# Patient Record
Sex: FEMALE | Race: WHITE | Employment: FULL TIME | URBAN - NONMETROPOLITAN AREA
[De-identification: names, ages, dates, MRNs, and addresses within clinical notes are randomized per-mention and may not be internally consistent; named-entity substitution may affect disease eponyms.]

---

## 2020-01-22 ENCOUNTER — OFFICE VISIT (OUTPATIENT)
Dept: ENT CLINIC | Age: 45
End: 2020-01-22
Payer: COMMERCIAL

## 2020-01-22 VITALS — TEMPERATURE: 98.2 F | RESPIRATION RATE: 18 BRPM | WEIGHT: 238 LBS | BODY MASS INDEX: 33.32 KG/M2 | HEIGHT: 71 IN

## 2020-01-22 PROBLEM — H69.93 DISORDER OF BOTH EUSTACHIAN TUBES: Status: ACTIVE | Noted: 2020-01-22

## 2020-01-22 PROBLEM — H65.23 CHRONIC SEROUS OTITIS MEDIA OF BOTH EARS: Status: ACTIVE | Noted: 2020-01-22

## 2020-01-22 PROBLEM — C91.10 CLL (CHRONIC LYMPHOCYTIC LEUKEMIA) (HCC): Status: ACTIVE | Noted: 2020-01-22

## 2020-01-22 PROCEDURE — 99213 OFFICE O/P EST LOW 20 MIN: CPT | Performed by: OTOLARYNGOLOGY

## 2020-01-22 PROCEDURE — 69433 CREATE EARDRUM OPENING: CPT | Performed by: OTOLARYNGOLOGY

## 2020-01-22 RX ORDER — MULTIVITAMIN,THERAPEUTIC
1 TABLET ORAL
COMMUNITY

## 2020-01-22 ASSESSMENT — ENCOUNTER SYMPTOMS
ANAL BLEEDING: 0
FACIAL SWELLING: 0
EYE ITCHING: 0
VOICE CHANGE: 0
CHOKING: 0
WHEEZING: 0
COLOR CHANGE: 0
ABDOMINAL DISTENTION: 0
COUGH: 0
EYE PAIN: 0
SINUS PRESSURE: 0
ABDOMINAL PAIN: 0
NAUSEA: 0
CHEST TIGHTNESS: 0
EYE REDNESS: 0
DIARRHEA: 0
CONSTIPATION: 0
STRIDOR: 0
PHOTOPHOBIA: 0
BACK PAIN: 0
RHINORRHEA: 0
EYE DISCHARGE: 0
SORE THROAT: 0
SHORTNESS OF BREATH: 0
BLOOD IN STOOL: 0
TROUBLE SWALLOWING: 0
SINUS PAIN: 0
VOMITING: 0
RECTAL PAIN: 0
APNEA: 0

## 2020-01-22 NOTE — PROGRESS NOTES
ear and used it x 1 week. Ciprodex in chart and Pt confirms this is what she used. Used Flonase x 1.5-2 week with no relief. Aggravating factors:  Nothing   Associated factors:  Denies ear drainage. Denies vertigo. Reports nasal/sinus congestion. Denies sinus pain and headaches. Snores awake and sleeping.  recently has told her she has sleep apnea. Denies nasal drainage. Denies hoarseness. Review of systems covering 10 systems is reviewed as staff entry in other note and pertinent positives and negatives noted.     Past Medical History:   Diagnosis Date    Anxiety     Apnea     Enlarged lymph nodes     Headache     IBS (irritable bowel syndrome)     Meningismus     Neuralgia     Obesity     Small cell B-cell lymphoma (HCC)        Current Outpatient Medications:     Multiple Vitamin (THERA/BETA-CAROTENE) TABS, Take 1 tablet by mouth, Disp: , Rfl:    Allergies   Allergen Reactions    Amoxicillin-Pot Clavulanate     Sulfa Antibiotics Rash      Past Surgical History:   Procedure Laterality Date    ANTERIOR CRUCIATE LIGAMENT REPAIR      CHOLECYSTECTOMY        Social History     Socioeconomic History    Marital status:      Spouse name: Not on file    Number of children: Not on file    Years of education: Not on file    Highest education level: Not on file   Occupational History    Not on file   Social Needs    Financial resource strain: Not on file    Food insecurity:     Worry: Not on file     Inability: Not on file    Transportation needs:     Medical: Not on file     Non-medical: Not on file   Tobacco Use    Smoking status: Never Smoker    Smokeless tobacco: Never Used   Substance and Sexual Activity    Alcohol use: Not Currently    Drug use: Never    Sexual activity: Not on file   Lifestyle    Physical activity:     Days per week: Not on file     Minutes per session: Not on file    Stress: Not on file   Relationships    Social connections:     Talks on phone: cyanosis, no edema    SKIN:    General Appearance: no lesions, warm and dry, normal turgor, no bruising    NEUROLOGICAL SYSTEM:    Orientation: oriented to time, oriented to place, oriented to person    Cranial Nerves: Cranial Nerves II-XII intact, normal facial movement    PSYCHIATRIC:    Mood and affect: normal mood, normal affect      TYMPANOSTOMY TUBE PLACEMENT NOTE (60836)    PROCEDURE PERFORMED BY: LYUBVO GREGG    PROCEDURE DATE: 1/22/2020    The patient and/or caregiver is counseled regarding the above procedure including risks, alternatives, potential complications and benefits, and is agreeable to proceed. Witnessed informed consent is obtained. A bilateral ear tube placement is performed as follows. With the patients consent, the operative microscope is used to visualize the tympanic membrane through an otic speculum. Any obstructing cerumen is removed. The zane-inferior portion of the tympanic membrane is then anesthetized by topical application of phenol. A myringotomy is then performed and any fluid present aspirated with a #5 suction. An Tejeda type II beveled grommet tube is then placed. The patient tolerated the procedure well without complication. There is noted to be improved hearing after the procedure. The patient is advised to avoid water exposure to the ear and to call for significant pain, bleeding, drainage, vertigo, or change in hearing. Assessment and Plan:    She presents with chronic effusions in a background of CLL. Bilateral ventilation tube placement is offered for relief of her ear symptoms as this has failed to resolved with waiting and nasal steroid therapy. This results in immediate improvement in her hearing and I am hopeful this will give her lasting relief. I suspect her CLL may involve the ET tube lymph tissues resulting in her chronic effusions, and adenotonsillar hypertrophy is noted as well as cervical adenopathy consistent with this.   She continues under oncology for her care in this regard. Diagnosis Orders   1. Disorder of both eustachian tubes     2. Chronic serous otitis media of both ears     3. CLL (chronic lymphocytic leukemia) ([facility] [address]          The patient and/or caregiver is to notify the office if no improvement or worsening of symptoms is noted prior to the scheduled follow-up for sooner evaluation. The patient and/or caregiver is able to state an understanding of these recommendations and is agreeable to the treatment plan.

## 2020-01-22 NOTE — PROGRESS NOTES
Review of Systems   Constitutional: Negative for activity change, appetite change, chills, diaphoresis, fatigue, fever and unexpected weight change. HENT: Positive for congestion and hearing loss. Negative for dental problem, drooling, ear discharge, ear pain, facial swelling, mouth sores, nosebleeds, postnasal drip, rhinorrhea, sinus pressure, sinus pain, sneezing, sore throat, tinnitus, trouble swallowing and voice change. Eyes: Negative for photophobia, pain, discharge, redness, itching and visual disturbance. Respiratory: Negative for apnea, cough, choking, chest tightness, shortness of breath, wheezing and stridor. Cardiovascular: Negative for chest pain, palpitations and leg swelling. Gastrointestinal: Negative for abdominal distention, abdominal pain, anal bleeding, blood in stool, constipation, diarrhea, nausea, rectal pain and vomiting. Endocrine: Negative for cold intolerance, heat intolerance, polydipsia, polyphagia and polyuria. Genitourinary: Negative for decreased urine volume, difficulty urinating, dyspareunia, dysuria, enuresis, flank pain, frequency, genital sores, hematuria, menstrual problem, pelvic pain, urgency, vaginal bleeding, vaginal discharge and vaginal pain. Musculoskeletal: Negative for arthralgias, back pain, gait problem, joint swelling, myalgias, neck pain and neck stiffness. Skin: Negative for color change, pallor, rash and wound. Allergic/Immunologic: Negative for environmental allergies, food allergies and immunocompromised state. Neurological: Negative for dizziness, tremors, seizures, syncope, facial asymmetry, speech difficulty, weakness, light-headedness, numbness and headaches. Hematological: Negative for adenopathy. Does not bruise/bleed easily. Psychiatric/Behavioral: Negative for agitation, behavioral problems, confusion, decreased concentration, dysphoric mood, hallucinations, self-injury, sleep disturbance and suicidal ideas.  The patient is not

## 2020-07-06 ENCOUNTER — OFFICE VISIT (OUTPATIENT)
Dept: ENT CLINIC | Age: 45
End: 2020-07-06
Payer: COMMERCIAL

## 2020-07-06 VITALS
WEIGHT: 232 LBS | SYSTOLIC BLOOD PRESSURE: 109 MMHG | HEART RATE: 91 BPM | TEMPERATURE: 97.2 F | BODY MASS INDEX: 32.48 KG/M2 | HEIGHT: 71 IN | DIASTOLIC BLOOD PRESSURE: 70 MMHG | OXYGEN SATURATION: 98 %

## 2020-07-06 PROCEDURE — 99214 OFFICE O/P EST MOD 30 MIN: CPT | Performed by: PHYSICIAN ASSISTANT

## 2020-07-06 ASSESSMENT — ENCOUNTER SYMPTOMS
ANAL BLEEDING: 0
EYE ITCHING: 0
VOICE CHANGE: 0
VOMITING: 0
ABDOMINAL DISTENTION: 0
RECTAL PAIN: 0
STRIDOR: 0
SORE THROAT: 0
SINUS PRESSURE: 0
APNEA: 0
BLOOD IN STOOL: 0
DIARRHEA: 0
SINUS PAIN: 0
COUGH: 0
BACK PAIN: 0
SHORTNESS OF BREATH: 0
EYE PAIN: 0
CHOKING: 0
EYE DISCHARGE: 1
COLOR CHANGE: 0
TROUBLE SWALLOWING: 0
PHOTOPHOBIA: 0
RHINORRHEA: 0
NAUSEA: 0
WHEEZING: 0
FACIAL SWELLING: 0
EYE REDNESS: 0
CONSTIPATION: 0
ABDOMINAL PAIN: 0
CHEST TIGHTNESS: 0

## 2020-07-06 NOTE — PROGRESS NOTES
Review of Systems   Constitutional: Positive for fatigue. Negative for activity change, appetite change, chills, diaphoresis, fever and unexpected weight change. HENT: Positive for ear pain (bilateral), sneezing and tinnitus. Negative for congestion, dental problem, drooling, ear discharge, facial swelling, hearing loss, mouth sores, nosebleeds, postnasal drip, rhinorrhea, sinus pressure, sinus pain, sore throat, trouble swallowing and voice change. Eyes: Positive for discharge (watery). Negative for photophobia, pain, redness, itching and visual disturbance. Respiratory: Negative for apnea, cough, choking, chest tightness, shortness of breath, wheezing and stridor. Cardiovascular: Negative for chest pain, palpitations and leg swelling. Gastrointestinal: Negative for abdominal distention, abdominal pain, anal bleeding, blood in stool, constipation, diarrhea, nausea, rectal pain and vomiting. Endocrine: Negative for cold intolerance, heat intolerance, polydipsia, polyphagia and polyuria. Genitourinary: Negative for decreased urine volume, difficulty urinating, dyspareunia, dysuria, enuresis, flank pain, frequency, genital sores, hematuria, menstrual problem, pelvic pain, urgency, vaginal bleeding, vaginal discharge and vaginal pain. Musculoskeletal: Negative for arthralgias, back pain, gait problem, joint swelling, myalgias, neck pain and neck stiffness. Skin: Negative for color change, pallor, rash and wound. Allergic/Immunologic: Negative for environmental allergies, food allergies and immunocompromised state. Neurological: Positive for headaches (occasional). Negative for dizziness, tremors, seizures, syncope, facial asymmetry, speech difficulty, weakness, light-headedness and numbness. Hematological: Negative for adenopathy. Does not bruise/bleed easily. Psychiatric/Behavioral: Positive for decreased concentration.  Negative for agitation, behavioral problems, confusion, dysphoric mood, hallucinations, self-injury, sleep disturbance and suicidal ideas. The patient is nervous/anxious. The patient is not hyperactive.

## 2020-07-06 NOTE — PATIENT INSTRUCTIONS
NASAL SALINE (SALTWATER) RINSES     Your doctor has recommended the use of saline (salt water) nasal rinses. Nasal rinses have been used for centuries for the treatment of nasal and sinus infection, bothersome secretions, allergy, and nasal dryness. It is safe and effective for use in both children and adults. Daily rinsing of the nasal passages with saline promotes nasal and sinus health by washing away dried mucus, infected secretions, dust, pollen, and mold spores, by moisturizing the nasal lining, promoting normal mucus flow, and naturally decongesting inflamed tissues. Rinses also help with clearing dried blood, mucus, and infection after sinus surgery and promote rapid healing of tissue in this setting. In the beginning it is normal to have some difficulty with performing nasal rinses, but with practice, most people find that it becomes part of their normal routine just as much as brushing teeth or showering, and often, most wouldnt think of leaving home without it. RINSE INGREDIENTS:  No special tools are required for nasal saline rinses, and everything that you need can be picked up at your local pharmacy and grocery. Commercial saline rinses and syringe systems such as NeilMed SINUS RINSE, Ayr Saline Nasal Rinse, or Neti Pot are available, but these may contain preservatives or other irritants, and it is often less expensive and more convenient to make it at home.   You will need the following:  Distilled water (tap water contains irritating minerals and bacteria, but may be used if boiled)  Kosher, pickling, or non-iodized table salt (regular table salt contains iodine, an irritant)  Baking soda (not baking powder)  Corn syrup (optional, for additional moisturizing effect)  White vinegar (for antiseptic effect, if recommended by your doctor)  An airtight container for mixing saline rinse  Rubber bulb syringe (like those used to clear infant noses and ears)  Household bleach (for cleaning container and bulb syringe)    PREPARATION:  To prepare the rinses, measure out 1 quart (1 liter) of distilled or boiled tap water into the mixing container. Then add 2-3 heaping teaspoons of salt and 1 teaspoon of baking soda, mix to dissolve, and place in refrigerator for 1-2 days of storage. You may add 3-4 tablespoons of corn syrup if you experience nasal dryness. If your doctor recommends it, add 1 teaspoon of white vinegar to this mixture as well. STORAGE & CLEANING:  It is advised that you make up fresh rinse every day or two, and that you keep the unused rinse in the refrigerator in an airtight container to prevent bacterial growth. Set out enough rinse for your next use well in advance to let it come to room temperature before use. Wash the container and bulb syringe at least once a week in a quart of water with 2 tablespoons of bleach, rinse and dry to prevent bacterial growth    USING SALINE RINSES:  To perform nasal saline rinses, plan on using at least 1 pint (2 cups) twice daily. Lean over a sink or other basin (some people prefer to do this in the shower as it can be messy, especially when you first start) to catch the rinse as it drains from your nose and mouth. Fill the bulb syringe with the rinse solution and place it into the nostril on one side. Gently squeeze the bulb to flush the nasal passage until the rinse drains clear. Repeat on the other side. You should plan on using the rinses twice a day, which should take about 10 minutes to perform. OTHER MEDICATIONS:  If your doctor has prescribed other nasal spray medications, such as a nasal steroid, it is best to apply these after the nasal rinse so that you do not wash the medication away. It is also safe to continue with your other antihistamine or decongestant medications that your doctor may have prescribed in addition to the saline rinses.   If you have an allergy or adverse reaction to any of the ingredients do not use it

## 2020-07-06 NOTE — PROGRESS NOTES
[facility] [address] [place] [address] [facility]  [address] [address]  Dept: [phone]   Lena Mercedes MD (supervising physician)    Alicia Workmanalpa 38 y.o. female     Patient presents with a chief complaint of Follow-up (Patient is present today with c/o pain and pressure in both ears. She denies experiencing symptoms of nausea, vomiting, fever, or chills. Patient does feel she has experienced decreased concentration and has been forgetful. )       /70 (Site: Left Upper Arm, Position: Sitting, Cuff Size: Medium Adult)   Pulse 91   Temp 97.2 °F (36.2 °C) (Infrared)   Ht 5' 11\" (1.803 m)   Wt 232 lb (105.2 kg)   LMP 06/02/2020 (Approximate)   SpO2 98%   Breastfeeding No   BMI 32.36 kg/m²       History of Presenting Illness: The patient/caregiver reports a history of complaint with the following features: Onset/Quality:  Ear pain x 1.5-2 weeks. Timing:   Duration:  Pain is intermittent. Lasts for a \"few\" minutes. Location:  Both ears/ can affect either ear. Severity:  Initially denied active ear pain, but at a point later during visit had brief pain in the left ear. Associated symptoms/other symptoms:  Denies fever. Can't say ear \"pressure\" when I inquire if ear pressure, although documented in chief complaint by nursing staff. Denies ear drainage. Sound in ears that started at least before COVID-19 pandemic started. Described as a swooshing sound. Sometimes affects the right ear, sometimes the left, and sometimes both. It is intermittent. Noticed more at night when it's quiet (position doesn't matter). Seems like when lots of background noise then has trouble hearing, but denies hearing loss otherwise. Denies heart palpitations. A few nights woke up sweating, but says she was hot. Otherwise denies sweating/sweats. Chronically enlarged neck nodes (has CLL), but Pt says not worse from baseline.   Throat not really hurting. Decreased smell x a couple years. Can smell pretty much anything, but has to get really close to faint smells to be able to smell it good. \"Not much\" drainage from her nose. Sneezes \"once in a while\". Says it always feels kind of dry in her nose. Last week (today is a Monday) had headaches and right ear was hurting inside. Location of headaches were in the forehead, behind eyes, and the sides of head. Intermittent headaches x years, but worse in the past week. Can get a headache if noise gets too loud or if she bumps head. Forgetfulness x years. Pt reports recent change in cognition. An example that she gives is that she used to be \"articulate\" and describes having trouble finding what words to say/use. Also notes poor concentration. Risk factors/other information:  Bilateral ear tubes placed 1/2020 for bilateral effusion. Denies getting water in her ears. CLL/SLL - not treated  Last saw oncologist via video 1-1.5 months ago. Has an in-person visit for later this month. Denies bruxism/clenching. Hx of environmental/seasonal allergies? Says she never used to get allergies. What has been tried? Outcome? No relief with Motrin and APAP. Hasn't tried heat. Alleviating factors: nothing reported  Aggravating factors:  Headaches can be brought on by loud noise or by bumping her head. Review of systems covering 10 systems is reviewed as staff entry in other note and pertinent positives and negatives noted.     Past Medical History:   Diagnosis Date    Anxiety     Apnea     Enlarged lymph nodes     Headache     IBS (irritable bowel syndrome)     Meningismus     Neuralgia     Obesity     Small cell B-cell lymphoma (HCC)        Current Outpatient Medications:     Multiple Vitamin (THERA/BETA-CAROTENE) TABS, Take 1 tablet by mouth, Disp: , Rfl:    Allergies   Allergen Reactions    Amoxicillin-Pot Clavulanate     Sulfa Antibiotics Rash      Past Surgical History:   Procedure Laterality Date    ANTERIOR CRUCIATE LIGAMENT REPAIR      CHOLECYSTECTOMY        Social History     Socioeconomic History    Marital status:      Spouse name: Not on file    Number of children: Not on file    Years of education: Not on file    Highest education level: Not on file   Occupational History    Not on file   Social Needs    Financial resource strain: Not on file    Food insecurity     Worry: Not on file     Inability: Not on file    Transportation needs     Medical: Not on file     Non-medical: Not on file   Tobacco Use    Smoking status: Never Smoker    Smokeless tobacco: Never Used   Substance and Sexual Activity    Alcohol use: Not Currently    Drug use: Never    Sexual activity: Not on file   Lifestyle    Physical activity     Days per week: Not on file     Minutes per session: Not on file    Stress: Not on file   Relationships    Social connections     Talks on phone: Not on file     Gets together: Not on file     Attends Oriental orthodox service: Not on file     Active member of club or organization: Not on file     Attends meetings of clubs or organizations: Not on file     Relationship status: Not on file    Intimate partner violence     Fear of current or ex partner: Not on file     Emotionally abused: Not on file     Physically abused: Not on file     Forced sexual activity: Not on file   Other Topics Concern    Not on file   Social History Narrative    Not on file     History reviewed. No pertinent family history.      PHYSICAL EXAM:    The patient was examined today 7/6/2020 with findings as follows:    CONSTITUTIONAL:    General Appearance: well-appearing, nontoxic, alert, no acute distress     Communication: understanding at normal conversational tones, normal voicing, speech intelligible    HEAD/FACE:    Head: atraumatic, normocephalic, no lesions    Facial Inspection: no lesions, healthy skin    Facial Strength: motor strength normal, symmetric strength, symmetric movement    Sinuses: no sinus tenderness    Salivary Glands: no enlargement of parotid glands, no tenderness of parotid glands, no masses of parotid glands, no enlargement of submandibular glands, no tenderness of submandibular glands, no masses of submandibular glands    Temporomandibular Joint: no crepitus with motion, no tenderness on palpation, no trismus, motion symmetric    EYES:  PERRL, extra-ocular movements intact, no nystagmus, sclera white, no redness of eyes, no watering of eyes    EARS:    Bilateral External Ears: no pits, no tags    Right External Ear: normally formed, no lesions; no mastoid tenderness, redness or swelling    Left External Ear: normally formed, no lesions; no mastoid tenderness, redness or swelling    Right External Auditory Canal: normally formed, no redness, no swelling, no lesions, healthy skin, no obstructing cerumen, no discharge    Left External Auditory Canal: normally formed, no redness, no swelling, no lesions, healthy skin, no obstructing cerumen, no discharge    Right Tympanic Membrane: normal landmarks, translucent, no perforation, grommet tube in place and patent without drainage, no redness or injection    Left Tympanic Membrane: normal landmarks, translucent, no perforation, grommet tube in place and patent without drainage, no redness or injection    Hearing: intact to spoken voice, intact to finger rub bilaterally    NOSE:    Nasal Skin: no lesions, no lacerations, no scars    Nasal Dorsum: symmetric with no visible or palpable deformities    Nasal Tip: normal symmetric nasal tip, normal nasal valves    Nasal Mucosa: normal, pink and moist, no drainage, no polyps    Septum: not markedly deformed, midline, no exposed vessels, no bleeding, no septal granuloma, no perforation    Turbinates: normal conformation, mild bilateral inferior turbinate congestion    ORAL CAVITY/MOUTH:    Lips, teeth, gums: normal lips, normal gums, dentition intact    Oral Mucosa: normal, moist, no lesions    Palate: normal hard palate, normal soft palate, symmetric palatal elevation    Floor of Mouth: normal floor of mouth    Tongue: normal tongue, no lesions, no edema, no masses, normal mucosa, mobile    Tonsils:  4+ left tonsil, 2+ right tonsil, asymmetric, no lesions or masses, no erythema, no exudate    Posterior pharynx: normally formed, no PND, no masses or lesions, no erythema, no exudate    NECK:    Neck:  bulky adenopathy bilaterally, trachea midline, functional active range of motion, no cysts or pits, no tenderness to palpation    LYMPH NODES:    Cervical: bulky adenopathy bilaterally    CARDIOVASCULAR:    Heart RRR, normal S1 and S2, no m/r/g  No carotid bruits    LUNGS:  CTAB, good air movement throughout (anteriorly, laterally, and posteriorly), no wheezes, no rhonchi, no crackles, no abnormal lung sounds    SKIN:    General Appearance:  warm and dry    NEUROLOGICAL SYSTEM:    Orientation: oriented to time, oriented to place, oriented to person, oriented to situation    Cranial Nerves: Cranial Nerves II-XII intact, normal facial movement    PSYCHIATRIC:    Mood and affect:  Affect appropriate for situation/setting. Assessment and Plan:  Pt advised ventilating tubes open and patent on exam today and no evidence of otitis media, otitis externa or other ear abnormality. Considered and discussed that recent onset otalgia symptoms may be referred (considered referred from throat/oral cavity, neck, TMJ/jaw). Discussed that her CLL may be playing a part in her ear complaints (considered may be directly involving/affecting the Eustachian tubes; given reported swooshing tinnitus considered that adenopathy may impinging vascular structures although the tinnitus is intermittent; considered there may be elevated blood viscosity in association with CLL disease process as a possible cause of tinnitus as well).     We have discussed that high doses of aspirin and related drugs, caffeine and other stimulants, alcohol use, and exposure to excess environmental noise can result in worsened tinnitus symptoms. Discussed that formal hearing testing may be indicated in the future. Due to the reported swooshing tinnitus, anemia and hyperthyroidism considered, although the tinnitus occurs intermittently. Pt says she had bloodwork done 1.5 months ago (at [facility]). Pt not sure what was done. Pt agrees to try and get results of this blood work. Discussed due to describing tinnitus as swooshing may want to check thyroid function to make sure not hyperthyroid (although I don't strongly suspect this) and may also want to check to make sure not anemic. In the EMR there is a 4/22/20 telemedicine visit with hematology & oncology indicating a Hgb of 12.8 at that time. RBC 4.43 at that time. WBC 14.0 at that time. I see no TSH lab. Pulse is 91 and BP is normal today. Reports nasal dryness and discussed use of nasal/sinus saline irrigations. The patient and/or caregiver is advised on the use of saline nasal rinses for moisturization, decongestion, and cleansing of the nasal and sinus cavities and an informational sheet on the preparation and use of saline is provided. Discussed with Pt that some patients report ear complaints with use of the irrigations and is typically due to the irrigation liquid making it's way into the Eustachian tubes. Due to recent change in cognition, and having CLL, I offered to start by ordering MRI of brain as Pt hasn't discussed this change in cognition with PCP or oncologist yet. Considered there may be a CNS mass as well as CLL metastatic to CNS. Pt advised I will call with MRI results. She has an appointment with her oncologist later this month and Pt was advised to tell her oncologist of the symptoms she has been having, especially the change in her mental status. Discussed rechecking ear tubes in 3 months, sooner if worsening.     The patient and/or caregiver is to notify the office if no improvement or worsening of symptoms is noted prior to the scheduled follow-up for sooner evaluation. The patient and/or caregiver is able to state an understanding of these recommendations and is agreeable to the treatment plan. Diagnosis Orders   1. Otalgia of both ears     2. Dysfunction of both eustachian tubes      Ear tubes in place and patent without drainage. 3. Tinnitus of both ears      Described as swooshing. 4. Cognitive impairment  MRI BRAIN W WO CONTRAST   5. CLL (chronic lymphocytic leukemia) (HCC)  MRI BRAIN W WO CONTRAST   6. Forgetfulness  MRI BRAIN W WO CONTRAST   7. Decreased sense of smell  MRI BRAIN W WO CONTRAST   8. Poor concentration  MRI BRAIN W WO CONTRAST      Return in about 3 months (around 10/6/2020).

## 2020-07-08 PROBLEM — H65.23 CHRONIC SEROUS OTITIS MEDIA OF BOTH EARS: Status: RESOLVED | Noted: 2020-01-22 | Resolved: 2020-07-08

## 2020-09-09 ENCOUNTER — HOSPITAL ENCOUNTER (OUTPATIENT)
Dept: MRI IMAGING | Age: 45
Discharge: HOME | End: 2020-09-11
Payer: COMMERCIAL

## 2020-09-09 PROCEDURE — 6360000004 HC RX CONTRAST MEDICATION: Performed by: PHYSICIAN ASSISTANT

## 2020-09-09 PROCEDURE — A9577 INJ MULTIHANCE: HCPCS | Performed by: PHYSICIAN ASSISTANT

## 2020-09-09 PROCEDURE — 70553 MRI BRAIN STEM W/O & W/DYE: CPT

## 2020-09-09 RX ADMIN — GADOBENATE DIMEGLUMINE 20 ML: 529 INJECTION, SOLUTION INTRAVENOUS at 09:43

## 2021-04-28 ENCOUNTER — OFFICE VISIT (OUTPATIENT)
Dept: ENT CLINIC | Age: 46
End: 2021-04-28
Payer: COMMERCIAL

## 2021-04-28 VITALS
RESPIRATION RATE: 16 BRPM | HEART RATE: 80 BPM | HEIGHT: 71 IN | SYSTOLIC BLOOD PRESSURE: 124 MMHG | OXYGEN SATURATION: 98 % | BODY MASS INDEX: 31.78 KG/M2 | WEIGHT: 227 LBS | DIASTOLIC BLOOD PRESSURE: 78 MMHG | TEMPERATURE: 97.9 F

## 2021-04-28 DIAGNOSIS — H92.01 OTALGIA, RIGHT EAR: ICD-10-CM

## 2021-04-28 DIAGNOSIS — H73.821 ATROPHIC NONFLACCID TYMPANIC MEMBRANE OF RIGHT EAR: ICD-10-CM

## 2021-04-28 DIAGNOSIS — H69.81 EUSTACHIAN TUBE DYSFUNCTION, RIGHT: Primary | ICD-10-CM

## 2021-04-28 PROCEDURE — 99213 OFFICE O/P EST LOW 20 MIN: CPT | Performed by: OTOLARYNGOLOGY

## 2021-04-28 RX ORDER — SODIUM CHLORIDE 0.9 % (FLUSH) 0.9 %
10 SYRINGE (ML) INJECTION PRN
Status: CANCELLED | OUTPATIENT
Start: 2021-04-28

## 2021-04-28 RX ORDER — SODIUM CHLORIDE 0.9 % (FLUSH) 0.9 %
10 SYRINGE (ML) INJECTION EVERY 12 HOURS SCHEDULED
Status: CANCELLED | OUTPATIENT
Start: 2021-04-28

## 2021-04-28 RX ORDER — SODIUM CHLORIDE 9 MG/ML
25 INJECTION, SOLUTION INTRAVENOUS PRN
Status: CANCELLED | OUTPATIENT
Start: 2021-04-28

## 2021-04-28 RX ORDER — VALACYCLOVIR HYDROCHLORIDE 500 MG/1
500 TABLET, FILM COATED ORAL DAILY
COMMUNITY
Start: 2020-09-23

## 2021-04-28 ASSESSMENT — ENCOUNTER SYMPTOMS
TROUBLE SWALLOWING: 0
BACK PAIN: 0
VOICE CHANGE: 0
DIARRHEA: 0
STRIDOR: 0
VOMITING: 0
RHINORRHEA: 1
CHEST TIGHTNESS: 0
COLOR CHANGE: 0
CHOKING: 0
WHEEZING: 0
EYE PAIN: 0
SINUS PRESSURE: 0
SINUS PAIN: 0
EYE ITCHING: 0
BLOOD IN STOOL: 0
EYE REDNESS: 0
SORE THROAT: 0
FACIAL SWELLING: 0
SHORTNESS OF BREATH: 0
RECTAL PAIN: 0
ABDOMINAL DISTENTION: 0
APNEA: 0
CONSTIPATION: 0
EYE DISCHARGE: 0
COUGH: 1
ABDOMINAL PAIN: 0
NAUSEA: 0
ANAL BLEEDING: 0
PHOTOPHOBIA: 0

## 2021-04-28 NOTE — H&P
[facility] [address], NOSE & THROAT SPECIALISTS  [address] [facility] [address]  Dept: MD Jammie Anthony 41 y.o. female     Patient presents with a chief complaint of Otalgia (Patient following up for right ear pain. Most recent office visit was 07/2020 with Hayde ROSARIO. Bilateral ear tube placement 01/2020. History of CLL.)       /78 (Site: Left Upper Arm, Position: Sitting, Cuff Size: Medium Adult)   Pulse 80   Temp 97.9 °F (36.6 °C) (Infrared)   Resp 16   Ht 5' 11\" (1.803 m)   Wt 227 lb (103 kg)   LMP 04/26/2021   SpO2 98%   BMI 31.66 kg/m²       History of Presenting Illness: The patient/caregiver reports a history of complaint with the following features: Onset: started 3 weeks ago  Timing: comes and goes  Duration: brief pain and bleeding, has to blow nose to pop ear and then has pain  Quality: right ear pain and pressure  Location: right ear  Severity: pain mild  Risk factors: prior ear tube placement with good relief, lost tubes a few months ago  Alleviating factors: relief with prior surgery  Aggravating factors: seasonal allergies  Associated factors: bleeding from right ear last week    Review of systems covering 10 systems is reviewed as staff entry in other note and pertinent positives and negatives noted.     Past Medical History:   Diagnosis Date    Anxiety     Apnea     Enlarged lymph nodes     Headache     IBS (irritable bowel syndrome)     Meningismus     Neuralgia     Obesity     Small cell B-cell lymphoma (HCC)        Current Outpatient Medications:     IBRUTINIB PO, Take by mouth, Disp: , Rfl:     valACYclovir (VALTREX) 500 MG tablet, Take 500 mg by mouth daily, Disp: , Rfl:     Multiple Vitamin (THERA/BETA-CAROTENE) TABS, Take 1 tablet by mouth, Disp: , Rfl:     VENETOCLAX PO, Take by mouth, Disp: , Rfl:    Allergies   Allergen Reactions    Dapsone Rash    Amoxicillin-Pot Clavulanate     Sulfa Antibiotics Rash      Past Surgical History:   Procedure Laterality Date    ANTERIOR CRUCIATE LIGAMENT REPAIR      CHOLECYSTECTOMY        Social History     Socioeconomic History    Marital status:      Spouse name: Not on file    Number of children: Not on file    Years of education: Not on file    Highest education level: Not on file   Occupational History    Not on file   Social Needs    Financial resource strain: Not on file    Food insecurity     Worry: Not on file     Inability: Not on file    Transportation needs     Medical: Not on file     Non-medical: Not on file   Tobacco Use    Smoking status: Never Smoker    Smokeless tobacco: Never Used   Substance and Sexual Activity    Alcohol use: Not Currently    Drug use: Never    Sexual activity: Not on file   Lifestyle    Physical activity     Days per week: Not on file     Minutes per session: Not on file    Stress: Not on file   Relationships    Social connections     Talks on phone: Not on file     Gets together: Not on file     Attends [employer] service: Not on file     Active member of club or organization: Not on file     Attends meetings of clubs or organizations: Not on file     Relationship status: Not on file    Intimate partner violence     Fear of current or ex partner: Not on file     Emotionally abused: Not on file     Physically abused: Not on file     Forced sexual activity: Not on file   Other Topics Concern    Not on file   Social History Narrative    Not on file     History reviewed. No pertinent family history.      PHYSICAL EXAM:    The patient was examined today 4/28/2021 with findings as follows:    CONSTITUTIONAL:    General Appearance: well-appearing, nontoxic, alert, no acute distress     Communication: understanding at normal conversational tones, normal voicing, speech intelligible    HEAD/FACE:    Head: atraumatic, normocephalic, no lesions    Facial Inspection: no lesions, healthy skin    Facial Strength: motor strength normal, symmetric strength, symmetric movement, motor strength    Sinuses: no sinus tenderness    Salivary Glands: no enlargements of parotid glands, no tenderness of parotid glands, no masses of parotid glands, clear salivary flow on palpation from Stensen's ducts, no duct stones of Stensen's duct, no enlargement of submandibular glands, no tenderness of submandibular glands, no masses of submandibular glands, clear salivary flow from Essex's ducts, no stones of Essex's ducts    Temporomandibular Joint: no crepitus with motion, no tenderness on palpation, no trismus, motion symmetric    EYES:    Pupils: PERRLA, extra-ocular movements intact, no nystagmus, sclera white, no redness of eyes, no watering of eyes    EARS:    Bilateral External Ears: no pits, no tags    Right External Ear: normally formed, no lesions, no mastoid tenderness    Left External Ear: normally formed, no lesions, no mastoid tenderness    Right External Auditory Canal: normal, healthy skin, no obstructing cerumen, no discharge, dried blood in anterior canal    Left External Auditory Canal: normal, healthy skin, no obstructing cerumen, no discharge    Right Tympanic Membrane: normal landmarks, translucent, immobile to pneumatic otoscopy, no perforation    Left Tympanic Membrane: normal landmarks, translucent, mobile to pneumatic otoscopy, no perforation    Hearing: intact to spoken voice, intact to finger rub    NOSE:    Nasal Skin: no lesions, no lacerations, no scars    Nasal Dorsum: symmetric with no visible or palpable deformities    Nasal Tip: normal symmetric nasal tip, normal nasal valves    Nasal Mucosa: normal, pink and moist    Septum: not markedly deformed, midline, no exposed vessels, no bleeding, no septal granuloma    Turbinates: normal size and conformation    Nasopharynx: normal    ORAL CAVITY/MOUTH:    Lips, teeth, gums: normal lips, normal gums, dentition intact, no dental pain on

## 2021-04-28 NOTE — H&P (VIEW-ONLY)
[facility] [address], [facility]  [address] [facility] [address]  Dept: MD Hayden Killian 41 y.o. female     Patient presents with a chief complaint of Otalgia (Patient following up for right ear pain. Most recent office visit was 07/2020 with Salas ROSARIO. Bilateral ear tube placement 01/2020. History of CLL.)       /78 (Site: Left Upper Arm, Position: Sitting, Cuff Size: Medium Adult)   Pulse 80   Temp 97.9 °F (36.6 °C) (Infrared)   Resp 16   Ht 5' 11\" (1.803 m)   Wt 227 lb (103 kg)   LMP 04/26/2021   SpO2 98%   BMI 31.66 kg/m²       History of Presenting Illness: The patient/caregiver reports a history of complaint with the following features: Onset: started 3 weeks ago  Timing: comes and goes  Duration: brief pain and bleeding, has to blow nose to pop ear and then has pain  Quality: right ear pain and pressure  Location: right ear  Severity: pain mild  Risk factors: prior ear tube placement with good relief, lost tubes a few months ago  Alleviating factors: relief with prior surgery  Aggravating factors: seasonal allergies  Associated factors: bleeding from right ear last week    Review of systems covering 10 systems is reviewed as staff entry in other note and pertinent positives and negatives noted.     Past Medical History:   Diagnosis Date    Anxiety     Apnea     Enlarged lymph nodes     Headache     IBS (irritable bowel syndrome)     Meningismus     Neuralgia     Obesity     Small cell B-cell lymphoma (HCC)        Current Outpatient Medications:     IBRUTINIB PO, Take by mouth, Disp: , Rfl:     valACYclovir (VALTREX) 500 MG tablet, Take 500 mg by mouth daily, Disp: , Rfl:     Multiple Vitamin (THERA/BETA-CAROTENE) TABS, Take 1 tablet by mouth, Disp: , Rfl:     VENETOCLAX PO, Take by mouth, Disp: , Rfl:    Allergies   Allergen Reactions    Dapsone Rash    Amoxicillin-Pot Clavulanate     Sulfa Antibiotics Rash      Past Surgical History:   Procedure Laterality Date    ANTERIOR CRUCIATE LIGAMENT REPAIR      CHOLECYSTECTOMY        Social History     Socioeconomic History    Marital status:      Spouse name: Not on file    Number of children: Not on file    Years of education: Not on file    Highest education level: Not on file   Occupational History    Not on file   Social Needs    Financial resource strain: Not on file    Food insecurity     Worry: Not on file     Inability: Not on file    Transportation needs     Medical: Not on file     Non-medical: Not on file   Tobacco Use    Smoking status: Never Smoker    Smokeless tobacco: Never Used   Substance and Sexual Activity    Alcohol use: Not Currently    Drug use: Never    Sexual activity: Not on file   Lifestyle    Physical activity     Days per week: Not on file     Minutes per session: Not on file    Stress: Not on file   Relationships    Social connections     Talks on phone: Not on file     Gets together: Not on file     Attends Baptism service: Not on file     Active member of club or organization: Not on file     Attends meetings of clubs or organizations: Not on file     Relationship status: Not on file    Intimate partner violence     Fear of current or ex partner: Not on file     Emotionally abused: Not on file     Physically abused: Not on file     Forced sexual activity: Not on file   Other Topics Concern    Not on file   Social History Narrative    Not on file     History reviewed. No pertinent family history.      PHYSICAL EXAM:    The patient was examined today 4/28/2021 with findings as follows:    CONSTITUTIONAL:    General Appearance: well-appearing, nontoxic, alert, no acute distress     Communication: understanding at normal conversational tones, normal voicing, speech intelligible    HEAD/FACE:    Head: atraumatic, normocephalic, no lesions    Facial Inspection: no lesions, healthy skin    Facial Strength: motor strength normal, symmetric strength, symmetric movement, motor strength    Sinuses: no sinus tenderness    Salivary Glands: no enlargements of parotid glands, no tenderness of parotid glands, no masses of parotid glands, clear salivary flow on palpation from Stensen's ducts, no duct stones of Stensen's duct, no enlargement of submandibular glands, no tenderness of submandibular glands, no masses of submandibular glands, clear salivary flow from [place]'s ducts, no stones of Erie's ducts    Temporomandibular Joint: no crepitus with motion, no tenderness on palpation, no trismus, motion symmetric    EYES:    Pupils: PERRLA, extra-ocular movements intact, no nystagmus, sclera white, no redness of eyes, no watering of eyes    EARS:    Bilateral External Ears: no pits, no tags    Right External Ear: normally formed, no lesions, no mastoid tenderness    Left External Ear: normally formed, no lesions, no mastoid tenderness    Right External Auditory Canal: normal, healthy skin, no obstructing cerumen, no discharge, dried blood in anterior canal    Left External Auditory Canal: normal, healthy skin, no obstructing cerumen, no discharge    Right Tympanic Membrane: normal landmarks, translucent, immobile to pneumatic otoscopy, no perforation    Left Tympanic Membrane: normal landmarks, translucent, mobile to pneumatic otoscopy, no perforation    Hearing: intact to spoken voice, intact to finger rub    NOSE:    Nasal Skin: no lesions, no lacerations, no scars    Nasal Dorsum: symmetric with no visible or palpable deformities    Nasal Tip: normal symmetric nasal tip, normal nasal valves    Nasal Mucosa: normal, pink and moist    Septum: not markedly deformed, midline, no exposed vessels, no bleeding, no septal granuloma    Turbinates: normal size and conformation    Nasopharynx: normal    ORAL CAVITY/MOUTH:    Lips, teeth, gums: normal lips, normal gums, dentition intact, no dental pain on palpation    Oral Mucosa: normal, moist, no lesions    Palate: normal hard palate, normal soft palate, symmetric palatal elevation    Floor of Mouth: normal floor of mouth    Tongue: normal tongue, no lesions, no edema, no masses, normal mucosa, mobile    Tonsils: normal tonsils, symmetric, no lesions    Posterior pharynx: normal    NECK:    Neck: no masses, trachea midline, normal range of motion, no cysts or pits, no tenderness to palpation    Thyroid: normal thyroid, no enlargement, no tenderness, no nodules    LYMPH NODES:    Cervical: no palpable lymph node enlargement    RESPIRATORY:    Inspection/Auscultation: good air movement, chest expands symmetrically, normal breath sounds, no wheezing, no stridor    CARDIOVASCULAR SYSTEM:    Auscultation: regular rate and rhythm, carotid pulse normal, no carotid thrills, no carotid bruits    Observation/Palpation of Peripheral Vascular System: no varicosities, no cyanosis, no edema    SKIN:    General Appearance: no lesions, warm and dry, normal turgor, no bruising    NEUROLOGICAL SYSTEM:    Orientation: oriented to time, oriented to place, oriented to person    PSYCHIATRIC:    Mood and affect: normal mood, normal affect          Assessment and Plan:    She has had intermittent bleeding form the right ear and pain and pressure after loss of her ear tubes. I see no perforation or active disease today and suspect a traumatic perforation that has healed as the cause of the reported bleeding, of which there is residual dried blood noted today. Replacement of the ventilation tube on the right is offered. Diagnosis Orders   1. Eustachian tube dysfunction, right     2. 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 Otalgia, right ear        No follow-ups on file. The patient and/or caregiver is to notify the office if no improvement or worsening of symptoms is noted prior to the scheduled follow-up for sooner evaluation.  The patient and/or caregiver is able to state an understanding of these recommendations and is agreeable to the treatment plan. --Nathalia Riddle MD on 4/28/2021 at 11:21 AM    An electronic signature was used to authenticate this note.

## 2021-04-28 NOTE — PATIENT INSTRUCTIONS
Additionally, as healing occurs around the tube skin may grow into the middle ear resulting in a condition called cholesteatoma. This would require additional and more extensive ear surgery to remove and can result in permanent injury to the eardrum or hearing. Benefits- Most myringotomy tube surgery is without complications and the success in relieving most conditions is excellent with significant reduction in the frequency of ear infection, reduced pain, and improved hearing. RECOVERY- What should I expect? Most patients have a very rapid recovery and can resume normal activities the same day as surgery after a short observation period. Once you have gone home, common events in the recovery period and expectations of what is normal are listed below. Call your doctor if you have any questions or concerns that are not answered here. Pain- Pain is usually mild if present. This can be treated with Tylenol? or ibuprofen dosed to the patients weight as noted in the package insert. If pain is severe, or not relieved by the pain medication listed above, call your doctor. Drainage- This may occur for a few days after surgery and should be clear to pink in color. If bright red blood, pus, or foul smelling drainage is noted, call your doctor. Nausea and vomiting- These are usually due to the effects of anesthesia and should subside in the first day or two. If they continue beyond this, call your doctor. Fever- A low grade temperature of less than 102º F for a few days after surgery is normal.  Notify your doctor for fever above this, or one that persists for greater than 3 days. Eating and drinking- A regular diet is usually well tolerated. Activity- Regular activities are permitted. Avoid getting water into the ears when bathing or swimming. The use of earplugs is recommended. Swimming in pools is permitted, but should be avoided in rivers, lakes, or soapy water.     CONTINUING CARE- What additional care do I need after surgery? Ear drops- You may be given ear drops after myringotomy tubes. To place ear drops, lie down with the head turned to the side and the ear upward. Gently grab the ear and pull outward and backward to open the ear canal.  Apply the drops as directed below. Apply ? oxymetazoline ?____________ , 3 drops each ear, 3 times a day, for 3 days. Ear plugs- The use of earplugs when around water is recommended. You may purchase sized plugs at your doctors office, or many commercial plugs are available at your favorite pharmacy. The most important factor is a comfortable fit that keeps water from entering the ear canal.   Follow-up- Your doctor will see you for follow-up evaluation in approximately two weeks after surgery unless another time has been arranged. Call the office if an appointment has not been previously scheduled. A hearing test to confirm that hearing has returned to normal levels will also be performed approximately one month after surgery, or when the ears are healthy. Your doctor will see you every three to four months to check that the ear tubes are in place and functional.  The tubes should fall out by themselves in 9-12 months. If you encounter problems and need to be seen sooner, please call to schedule an appointment. NOTICE:  THIS DOCUMENT IS INTENDED SOLELY FOR PATIENT EDUCATIONAL PURPOSES AND IS PROVIDED AS A COURTESY TO PATIENTS OF DR. Princess Hong MD AND Deanna Spear PA-C. IT IS NOT INTENDED AS A SUBSTITUTE FOR PROFESSIONAL MEDICAL CARE OR ADVICE. THE PATIENT SHOULD SEEK ADVICE FROM THE PHYSICIAN IF THERE ARE ANY QUESTIONS ABOUT THE CONTENTS OR DIRECTIONS PROVIDED IN THIS DOCUMENT.

## 2021-04-28 NOTE — PROGRESS NOTES
[facility] [address], [facility]  [address] [facility] [address] [facility] [address]  Dept: MD Susan Packer 41 y.o. female     Patient presents with a chief complaint of Otalgia (Patient following up for right ear pain. Most recent office visit was 07/2020 with Cherylkeri Starr PA. Bilateral ear tube placement 01/2020. History of CLL.)       /78 (Site: Left Upper Arm, Position: Sitting, Cuff Size: Medium Adult)   Pulse 80   Temp 97.9 °F (36.6 °C) (Infrared)   Resp 16   Ht 5' 11\" (1.803 m)   Wt 227 lb (103 kg)   LMP 04/26/2021   SpO2 98%   BMI 31.66 kg/m²       History of Presenting Illness: The patient/caregiver reports a history of complaint with the following features: Onset: started 3 weeks ago  Timing: comes and goes  Duration: brief pain and bleeding, has to blow nose to pop ear and then has pain  Quality: right ear pain and pressure  Location: right ear  Severity: pain mild  Risk factors: prior ear tube placement with good relief, lost tubes a few months ago  Alleviating factors: relief with prior surgery  Aggravating factors: seasonal allergies  Associated factors: bleeding from right ear last week    Review of systems covering 10 systems is reviewed as staff entry in other note and pertinent positives and negatives noted.     Past Medical History:   Diagnosis Date    Anxiety     Apnea     Enlarged lymph nodes     Headache     IBS (irritable bowel syndrome)     Meningismus     Neuralgia     Obesity     Small cell B-cell lymphoma (HCC)        Current Outpatient Medications:     IBRUTINIB PO, Take by mouth, Disp: , Rfl:     valACYclovir (VALTREX) 500 MG tablet, Take 500 mg by mouth daily, Disp: , Rfl:     Multiple Vitamin (THERA/BETA-CAROTENE) TABS, Take 1 tablet by mouth, Disp: , Rfl:     VENETOCLAX PO, Take by mouth, Disp: , Rfl:    Allergies   Allergen Reactions    Dapsone Rash    Amoxicillin-Pot Clavulanate     Sulfa Antibiotics Rash      Past Surgical History:   Procedure Laterality Date    ANTERIOR CRUCIATE LIGAMENT REPAIR      CHOLECYSTECTOMY        Social History     Socioeconomic History    Marital status:      Spouse name: Not on file    Number of children: Not on file    Years of education: Not on file    Highest education level: Not on file   Occupational History    Not on file   Social Needs    Financial resource strain: Not on file    Food insecurity     Worry: Not on file     Inability: Not on file    Transportation needs     Medical: Not on file     Non-medical: Not on file   Tobacco Use    Smoking status: Never Smoker    Smokeless tobacco: Never Used   Substance and Sexual Activity    Alcohol use: Not Currently    Drug use: Never    Sexual activity: Not on file   Lifestyle    Physical activity     Days per week: Not on file     Minutes per session: Not on file    Stress: Not on file   Relationships    Social connections     Talks on phone: Not on file     Gets together: Not on file     Attends Taoist service: Not on file     Active member of club or organization: Not on file     Attends meetings of clubs or organizations: Not on file     Relationship status: Not on file    Intimate partner violence     Fear of current or ex partner: Not on file     Emotionally abused: Not on file     Physically abused: Not on file     Forced sexual activity: Not on file   Other Topics Concern    Not on file   Social History Narrative    Not on file     History reviewed. No pertinent family history.      PHYSICAL EXAM:    The patient was examined today 4/28/2021 with findings as follows:    CONSTITUTIONAL:    General Appearance: well-appearing, nontoxic, alert, no acute distress     Communication: understanding at normal conversational tones, normal voicing, speech intelligible    HEAD/FACE:    Head: atraumatic, normocephalic, no lesions    Facial Inspection: no lesions, healthy skin    Facial Strength: motor strength normal, symmetric strength, symmetric movement, motor strength    Sinuses: no sinus tenderness    Salivary Glands: no enlargements of parotid glands, no tenderness of parotid glands, no masses of parotid glands, clear salivary flow on palpation from Stensen's ducts, no duct stones of Stensen's duct, no enlargement of submandibular glands, no tenderness of submandibular glands, no masses of submandibular glands, clear salivary flow from [place]'s ducts, no stones of [place]'s ducts    Temporomandibular Joint: no crepitus with motion, no tenderness on palpation, no trismus, motion symmetric    EYES:    Pupils: PERRLA, extra-ocular movements intact, no nystagmus, sclera white, no redness of eyes, no watering of eyes    EARS:    Bilateral External Ears: no pits, no tags    Right External Ear: normally formed, no lesions, no mastoid tenderness    Left External Ear: normally formed, no lesions, no mastoid tenderness    Right External Auditory Canal: normal, healthy skin, no obstructing cerumen, no discharge, dried blood in anterior canal    Left External Auditory Canal: normal, healthy skin, no obstructing cerumen, no discharge    Right Tympanic Membrane: normal landmarks, translucent, immobile to pneumatic otoscopy, no perforation    Left Tympanic Membrane: normal landmarks, translucent, mobile to pneumatic otoscopy, no perforation    Hearing: intact to spoken voice, intact to finger rub    NOSE:    Nasal Skin: no lesions, no lacerations, no scars    Nasal Dorsum: symmetric with no visible or palpable deformities    Nasal Tip: normal symmetric nasal tip, normal nasal valves    Nasal Mucosa: normal, pink and moist    Septum: not markedly deformed, midline, no exposed vessels, no bleeding, no septal granuloma    Turbinates: normal size and conformation    Nasopharynx: normal    ORAL CAVITY/MOUTH:    Lips, teeth, gums: normal lips, normal gums, dentition intact, no dental pain on palpation    Oral Mucosa: normal, moist, no lesions    Palate: normal hard palate, normal soft palate, symmetric palatal elevation    Floor of Mouth: normal floor of mouth    Tongue: normal tongue, no lesions, no edema, no masses, normal mucosa, mobile    Tonsils: normal tonsils, symmetric, no lesions    Posterior pharynx: normal    NECK:    Neck: no masses, trachea midline, normal range of motion, no cysts or pits, no tenderness to palpation    Thyroid: normal thyroid, no enlargement, no tenderness, no nodules    LYMPH NODES:    Cervical: no palpable lymph node enlargement    RESPIRATORY:    Inspection/Auscultation: good air movement, chest expands symmetrically, normal breath sounds, no wheezing, no stridor    CARDIOVASCULAR SYSTEM:    Auscultation: regular rate and rhythm, carotid pulse normal, no carotid thrills, no carotid bruits    Observation/Palpation of Peripheral Vascular System: no varicosities, no cyanosis, no edema    SKIN:    General Appearance: no lesions, warm and dry, normal turgor, no bruising    NEUROLOGICAL SYSTEM:    Orientation: oriented to time, oriented to place, oriented to person    PSYCHIATRIC:    Mood and affect: normal mood, normal affect          Assessment and Plan:    She has had intermittent bleeding form the right ear and pain and pressure after loss of her ear tubes. I see no perforation or active disease today and suspect a traumatic perforation that has healed as the cause of the reported bleeding, of which there is residual dried blood noted today. Replacement of the ventilation tube on the right is offered. Diagnosis Orders   1. Eustachian tube dysfunction, right     2. 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 Otalgia, right ear        No follow-ups on file. The patient and/or caregiver is to notify the office if no improvement or worsening of symptoms is noted prior to the scheduled follow-up for sooner evaluation.  The patient and/or caregiver is able to state an understanding of these recommendations and is agreeable to the treatment plan. --Kenneth Solitario MD on 4/28/2021 at 11:19 AM    An electronic signature was used to authenticate this note.

## 2021-05-18 ENCOUNTER — ANESTHESIA (OUTPATIENT)
Dept: OPERATING ROOM | Age: 46
End: 2021-05-18
Payer: COMMERCIAL

## 2021-05-18 ENCOUNTER — HOSPITAL ENCOUNTER (OUTPATIENT)
Dept: PREADMISSION TESTING | Age: 46
Setting detail: SPECIMEN
Discharge: HOME | End: 2021-05-18
Payer: COMMERCIAL

## 2021-05-18 ENCOUNTER — ANESTHESIA EVENT (OUTPATIENT)
Dept: OPERATING ROOM | Age: 46
End: 2021-05-18
Payer: COMMERCIAL

## 2021-05-18 ENCOUNTER — HOSPITAL ENCOUNTER (OUTPATIENT)
Age: 46
Setting detail: OUTPATIENT SURGERY
Discharge: HOME | End: 2021-05-18
Attending: OTOLARYNGOLOGY | Admitting: OTOLARYNGOLOGY
Payer: COMMERCIAL

## 2021-05-18 VITALS
DIASTOLIC BLOOD PRESSURE: 86 MMHG | HEIGHT: 71 IN | BODY MASS INDEX: 32.65 KG/M2 | OXYGEN SATURATION: 98 % | WEIGHT: 233.2 LBS | TEMPERATURE: 97.1 F | HEART RATE: 60 BPM | SYSTOLIC BLOOD PRESSURE: 126 MMHG | RESPIRATION RATE: 18 BRPM

## 2021-05-18 VITALS
OXYGEN SATURATION: 100 % | DIASTOLIC BLOOD PRESSURE: 58 MMHG | RESPIRATION RATE: 10 BRPM | SYSTOLIC BLOOD PRESSURE: 109 MMHG

## 2021-05-18 LAB
HCG(URINE) PREGNANCY TEST: NEGATIVE
SARS-COV-2, RAPID: NOT DETECTED
SPECIMEN DESCRIPTION: NORMAL

## 2021-05-18 PROCEDURE — 2709999900 HC NON-CHARGEABLE SUPPLY: Performed by: OTOLARYNGOLOGY

## 2021-05-18 PROCEDURE — 3600000013 HC SURGERY LEVEL 3 ADDTL 15MIN: Performed by: OTOLARYNGOLOGY

## 2021-05-18 PROCEDURE — 87635 SARS-COV-2 COVID-19 AMP PRB: CPT

## 2021-05-18 PROCEDURE — 69436 CREATE EARDRUM OPENING: CPT | Performed by: OTOLARYNGOLOGY

## 2021-05-18 PROCEDURE — 6360000002 HC RX W HCPCS: Performed by: NURSE ANESTHETIST, CERTIFIED REGISTERED

## 2021-05-18 PROCEDURE — 6370000000 HC RX 637 (ALT 250 FOR IP): Performed by: OTOLARYNGOLOGY

## 2021-05-18 PROCEDURE — 2780000010 HC IMPLANT OTHER: Performed by: OTOLARYNGOLOGY

## 2021-05-18 PROCEDURE — 7100000010 HC PHASE II RECOVERY - FIRST 15 MIN: Performed by: OTOLARYNGOLOGY

## 2021-05-18 PROCEDURE — 2580000003 HC RX 258: Performed by: OTOLARYNGOLOGY

## 2021-05-18 PROCEDURE — 3700000000 HC ANESTHESIA ATTENDED CARE: Performed by: OTOLARYNGOLOGY

## 2021-05-18 PROCEDURE — 7100000011 HC PHASE II RECOVERY - ADDTL 15 MIN: Performed by: OTOLARYNGOLOGY

## 2021-05-18 PROCEDURE — C9803 HOPD COVID-19 SPEC COLLECT: HCPCS

## 2021-05-18 PROCEDURE — 2500000003 HC RX 250 WO HCPCS: Performed by: NURSE ANESTHETIST, CERTIFIED REGISTERED

## 2021-05-18 PROCEDURE — 81025 URINE PREGNANCY TEST: CPT

## 2021-05-18 PROCEDURE — 2580000003 HC RX 258: Performed by: NURSE ANESTHETIST, CERTIFIED REGISTERED

## 2021-05-18 PROCEDURE — 3700000001 HC ADD 15 MINUTES (ANESTHESIA): Performed by: OTOLARYNGOLOGY

## 2021-05-18 PROCEDURE — 3600000003 HC SURGERY LEVEL 3 BASE: Performed by: OTOLARYNGOLOGY

## 2021-05-18 DEVICE — VENT TUBE 24751 5PK GOODE T 1.14MM SIL
Type: IMPLANTABLE DEVICE | Status: FUNCTIONAL
Brand: GOODE T-TUBE®

## 2021-05-18 RX ORDER — OXYMETAZOLINE HYDROCHLORIDE 0.05 G/100ML
SPRAY NASAL
Qty: 1 BOTTLE | Refills: 0 | COMMUNITY
Start: 2021-05-18

## 2021-05-18 RX ORDER — ACETAMINOPHEN 325 MG/1
650 TABLET ORAL EVERY 4 HOURS PRN
Status: DISCONTINUED | OUTPATIENT
Start: 2021-05-18 | End: 2021-05-18 | Stop reason: HOSPADM

## 2021-05-18 RX ORDER — SODIUM CHLORIDE 0.9 % (FLUSH) 0.9 %
5-40 SYRINGE (ML) INJECTION PRN
Status: DISCONTINUED | OUTPATIENT
Start: 2021-05-18 | End: 2021-05-18 | Stop reason: HOSPADM

## 2021-05-18 RX ORDER — OXYMETAZOLINE HYDROCHLORIDE 0.05 G/100ML
SPRAY NASAL PRN
Status: DISCONTINUED | OUTPATIENT
Start: 2021-05-18 | End: 2021-05-18 | Stop reason: ALTCHOICE

## 2021-05-18 RX ORDER — PROPOFOL 10 MG/ML
INJECTION, EMULSION INTRAVENOUS PRN
Status: DISCONTINUED | OUTPATIENT
Start: 2021-05-18 | End: 2021-05-18 | Stop reason: SDUPTHER

## 2021-05-18 RX ORDER — MAGNESIUM HYDROXIDE 1200 MG/15ML
LIQUID ORAL CONTINUOUS PRN
Status: COMPLETED | OUTPATIENT
Start: 2021-05-18 | End: 2021-05-18

## 2021-05-18 RX ORDER — FENTANYL CITRATE 50 UG/ML
INJECTION, SOLUTION INTRAMUSCULAR; INTRAVENOUS PRN
Status: DISCONTINUED | OUTPATIENT
Start: 2021-05-18 | End: 2021-05-18 | Stop reason: SDUPTHER

## 2021-05-18 RX ORDER — LIDOCAINE HYDROCHLORIDE 10 MG/ML
INJECTION, SOLUTION EPIDURAL; INFILTRATION; INTRACAUDAL; PERINEURAL PRN
Status: DISCONTINUED | OUTPATIENT
Start: 2021-05-18 | End: 2021-05-18 | Stop reason: SDUPTHER

## 2021-05-18 RX ORDER — PROPOFOL 10 MG/ML
INJECTION, EMULSION INTRAVENOUS CONTINUOUS PRN
Status: DISCONTINUED | OUTPATIENT
Start: 2021-05-18 | End: 2021-05-18 | Stop reason: SDUPTHER

## 2021-05-18 RX ORDER — SODIUM CHLORIDE, SODIUM LACTATE, POTASSIUM CHLORIDE, CALCIUM CHLORIDE 600; 310; 30; 20 MG/100ML; MG/100ML; MG/100ML; MG/100ML
INJECTION, SOLUTION INTRAVENOUS CONTINUOUS PRN
Status: DISCONTINUED | OUTPATIENT
Start: 2021-05-18 | End: 2021-05-18 | Stop reason: SDUPTHER

## 2021-05-18 RX ORDER — SODIUM CHLORIDE 0.9 % (FLUSH) 0.9 %
10 SYRINGE (ML) INJECTION EVERY 12 HOURS SCHEDULED
Status: DISCONTINUED | OUTPATIENT
Start: 2021-05-18 | End: 2021-05-18 | Stop reason: HOSPADM

## 2021-05-18 RX ORDER — SODIUM CHLORIDE 0.9 % (FLUSH) 0.9 %
5-40 SYRINGE (ML) INJECTION EVERY 12 HOURS SCHEDULED
Status: DISCONTINUED | OUTPATIENT
Start: 2021-05-18 | End: 2021-05-18 | Stop reason: HOSPADM

## 2021-05-18 RX ORDER — IBUPROFEN 200 MG
400 TABLET ORAL EVERY 6 HOURS PRN
Status: DISCONTINUED | OUTPATIENT
Start: 2021-05-18 | End: 2021-05-18 | Stop reason: HOSPADM

## 2021-05-18 RX ORDER — IBUPROFEN 200 MG
400 TABLET ORAL EVERY 6 HOURS PRN
Qty: 1 TABLET | Refills: 0 | COMMUNITY
Start: 2021-05-18

## 2021-05-18 RX ORDER — SODIUM CHLORIDE 0.9 % (FLUSH) 0.9 %
10 SYRINGE (ML) INJECTION PRN
Status: DISCONTINUED | OUTPATIENT
Start: 2021-05-18 | End: 2021-05-18 | Stop reason: HOSPADM

## 2021-05-18 RX ORDER — SODIUM CHLORIDE 9 MG/ML
25 INJECTION, SOLUTION INTRAVENOUS PRN
Status: DISCONTINUED | OUTPATIENT
Start: 2021-05-18 | End: 2021-05-18 | Stop reason: HOSPADM

## 2021-05-18 RX ORDER — MIDAZOLAM HYDROCHLORIDE 2 MG/2ML
INJECTION, SOLUTION INTRAMUSCULAR; INTRAVENOUS PRN
Status: DISCONTINUED | OUTPATIENT
Start: 2021-05-18 | End: 2021-05-18 | Stop reason: SDUPTHER

## 2021-05-18 RX ORDER — ACETAMINOPHEN 325 MG/1
TABLET ORAL
Qty: 1 TABLET | Refills: 0 | COMMUNITY
Start: 2021-05-18

## 2021-05-18 RX ADMIN — FENTANYL CITRATE 50 MCG: 50 INJECTION, SOLUTION INTRAMUSCULAR; INTRAVENOUS at 10:30

## 2021-05-18 RX ADMIN — FENTANYL CITRATE 25 MCG: 50 INJECTION, SOLUTION INTRAMUSCULAR; INTRAVENOUS at 10:12

## 2021-05-18 RX ADMIN — SODIUM CHLORIDE, POTASSIUM CHLORIDE, SODIUM LACTATE AND CALCIUM CHLORIDE: 600; 310; 30; 20 INJECTION, SOLUTION INTRAVENOUS at 10:12

## 2021-05-18 RX ADMIN — PROPOFOL 40 MG: 10 INJECTION, EMULSION INTRAVENOUS at 10:21

## 2021-05-18 RX ADMIN — LIDOCAINE HYDROCHLORIDE 100 MG: 10 INJECTION, SOLUTION EPIDURAL; INFILTRATION; INTRACAUDAL; PERINEURAL at 10:21

## 2021-05-18 RX ADMIN — MIDAZOLAM HYDROCHLORIDE 2 MG: 1 INJECTION, SOLUTION INTRAMUSCULAR; INTRAVENOUS at 10:12

## 2021-05-18 RX ADMIN — FENTANYL CITRATE 25 MCG: 50 INJECTION, SOLUTION INTRAMUSCULAR; INTRAVENOUS at 10:21

## 2021-05-18 RX ADMIN — PROPOFOL 100 MCG/KG/MIN: 10 INJECTION, EMULSION INTRAVENOUS at 10:21

## 2021-05-18 ASSESSMENT — PAIN DESCRIPTION - DESCRIPTORS: DESCRIPTORS: CONSTANT;ACHING

## 2021-05-18 ASSESSMENT — PAIN DESCRIPTION - ORIENTATION: ORIENTATION: RIGHT

## 2021-05-18 ASSESSMENT — PAIN DESCRIPTION - PAIN TYPE: TYPE: SURGICAL PAIN;ACUTE PAIN

## 2021-05-18 NOTE — INTERVAL H&P NOTE
History & Physical Examination Update    Patient's history and physical from her pre-operative evaluation was reviewed. On review and examination today, 5/18/2021, there has been no significant change in the treatment plan or examination unless noted below.     Lydia Espinal MD

## 2021-05-18 NOTE — OP NOTE
REPORT OF OPERATION      Patient Name: Yola Avery   YOB: 1975   MRN: [id number]   Date of Procedure: 5/18/2021         Pre-operative diagnosis: ET dysfunction, right otalgia      Post-operative diagnosis: Same    Procedure:   Right T-tube placement      Yola Avery is a 41 y.o. female who presents with right otalgia and tympanic retraction with recent rupture. She also has a history of ear disease relieved with prior ventilation tube placement that have since extruded. The above procedure was advised in the hopes of relief and the patient and/or caregiver was agreeable to proceed. The risks, alternatives, potential complications, and benefits were discussed at length and witnessed informed consent obtained. Procedure detail:  The patient was identified in the preoperative holding and brought to the operating room and positioned on the operative table. Monitored anesthesia was then induced. When appropriate anesthesia was obtained, the operative microscope was brought into the field and the right ear examined through an otic speculum. Any obstructing cerumen was removed with a cerumen loop. This was noted to be dry and crusted with some old blood. The tympanic membrane was examined and retraction noted. A myringotomy was then placed in the zane-inferior quadrant. A Belkis T-tube was then placed followed by 0.05% oxymetazoline drops. The patient was then returned to anesthesia and revived having tolerated the procedure well and returned to recovery.               Anesthesia: IV Sedation     Surgeon(s):  Nati Lambert MD     Staff:  Scrub Person First: Annalee Sanders    Estimated blood loss: 0 mL    Fluids: 100 mL    Specimens: none    Drains: none    Grafts or implants: Sabra Oddi T tubes    Complications: none        LYUBOV Kumari MD   Date: 5/18/21   Time: 10:34 AM EDT

## 2021-05-18 NOTE — ANESTHESIA PRE PROCEDURE
Department of Anesthesiology  Preprocedure Note       Name:  Kiera Beasley   Age:  41 y.o.  :  1975                                          MRN:  [id number]         Date:  2021      Surgeon: Leroy Vaz):  Marek Walden MD    Procedure: Procedure(s): MYRINGOTOMY TUBE INSERTION    Medications prior to admission:   Prior to Admission medications    Medication Sig Start Date End Date Taking? Authorizing Provider   IBRUTINIB PO Take by mouth   Yes Historical Provider, MD   VENETOCLAX PO Take by mouth   Yes Historical Provider, MD   Multiple Vitamin (THERA/BETA-CAROTENE) TABS Take 1 tablet by mouth   Yes Historical Provider, MD   valACYclovir (VALTREX) 500 MG tablet Take 500 mg by mouth daily 20   Historical Provider, MD       Current medications:    Current Facility-Administered Medications   Medication Dose Route Frequency Provider Last Rate Last Admin    0.9 % sodium chloride infusion  25 mL Intravenous PRN Marek Walden MD        sodium chloride flush 0.9 % injection 10 mL  10 mL Intravenous 2 times per day Marek Walden MD        sodium chloride flush 0.9 % injection 10 mL  10 mL Intravenous PRN Marke Walden MD           Allergies:     Allergies   Allergen Reactions    Dapsone Rash    Amoxicillin-Pot Clavulanate     Sulfa Antibiotics Rash       Problem List:    Patient Active Problem List   Diagnosis Code    Disorder of both eustachian tubes H69.93    CLL (chronic lymphocytic leukemia) (HCC) C91.10    Eustachian tube dysfunction, right H69.81    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    Otalgia, right ear H92.01       Past Medical History:        Diagnosis Date    Anxiety     Apnea     Enlarged lymph nodes     Headache     IBS (irritable bowel syndrome)     Meningismus     Neuralgia     Obesity     Small cell B-cell lymphoma ([facility] [address]        Past Surgical History:        Procedure Laterality Date    ANTERIOR CRUCIATE LIGAMENT REPAIR      BONE MARROW BIOPSY  09/2020    CHOLECYSTECTOMY         Social History:    Social History     Tobacco Use    Smoking status: Never Smoker    Smokeless tobacco: Never Used   Substance Use Topics    Alcohol use: Not Currently                                Counseling given: Not Answered      Vital Signs (Current):   Vitals:    05/18/21 0850 05/18/21 0857   BP:  130/82   Pulse:  71   Resp:  20   Temp:  37.1 °C (98.8 °F)   TempSrc:  Temporal   SpO2:  98%   Weight: 233 lb 3.2 oz (105.8 kg)    Height: 5' 11\" (1.803 m)                                               BP Readings from Last 3 Encounters:   05/18/21 130/82   04/28/21 124/78   07/06/20 109/70       NPO Status: Time of last liquid consumption: 0100                        Time of last solid consumption: 2000                        Date of last liquid consumption: 05/18/21                        Date of last solid food consumption: 05/17/21    BMI:   Wt Readings from Last 3 Encounters:   05/18/21 233 lb 3.2 oz (105.8 kg)   04/28/21 227 lb (103 kg)   07/06/20 232 lb (105.2 kg)     Body mass index is 32.52 kg/m². CBC: No results found for: WBC, RBC, HGB, HCT, MCV, RDW, PLT    CMP: No results found for: NA, K, CL, CO2, BUN, CREATININE, GFRAA, AGRATIO, LABGLOM, GLUCOSE, PROT, CALCIUM, BILITOT, ALKPHOS, AST, ALT    POC Tests: No results for input(s): POCGLU, POCNA, POCK, POCCL, POCBUN, POCHEMO, POCHCT in the last 72 hours.     Coags: No results found for: PROTIME, INR, APTT    HCG (If Applicable):   Lab Results   Component Value Date    PREGTESTUR NEGATIVE 05/18/2021        ABGs: No results found for: PHART, PO2ART, CYL4QCJ, AFY7XJT, BEART, U8NHZMHF     Type & Screen (If Applicable):  No results found for: LABABO, LABRH    Drug/Infectious Status (If Applicable):  No results found for: HIV, HEPCAB    COVID-19 Screening (If Applicable):   Lab Results   Component Value Date    COVID19 Not Detected 05/18/2021           Anesthesia Evaluation  Patient summary reviewed and Nursing notes reviewed  Airway: Mallampati: II  TM distance: >3 FB   Neck ROM: full  Mouth opening: > = 3 FB Dental: normal exam         Pulmonary:Negative Pulmonary ROS breath sounds clear to auscultation                             Cardiovascular:Negative CV ROS  Exercise tolerance: good (>4 METS),           Rhythm: regular  Rate: normal                    Neuro/Psych:   Negative Neuro/Psych ROS              GI/Hepatic/Renal:   (+) GERD: no interval change,           Endo/Other: Negative Endo/Other ROS   (+) malignancy/cancer (lymphoma). Abdominal:   (+) obese,     Abdomen: soft. Vascular: negative vascular ROS. Anesthesia Plan      MAC     ASA 3       Induction: intravenous. MIPS: Postoperative opioids intended and Prophylactic antiemetics administered. Anesthetic plan and risks discussed with patient. Plan discussed with attending.                 LY Moreno - CRNA   5/18/2021

## 2021-05-18 NOTE — ANESTHESIA POSTPROCEDURE EVALUATION
Department of Anesthesiology  Postprocedure Note    Patient: Johnson Casas  MRN: [id number]  YOB: 1975  Date of evaluation: 5/18/2021  Time:  11:15 AM     Procedure Summary     Date: 05/18/21 Room / Location: [address]    Anesthesia Start: 1015 Anesthesia Stop: 1034    Procedure: MYRINGOTOMY TUBE INSERTION (Right ) Diagnosis: (ET dysfunction, right otalgia)    Surgeons: Modesto Hernandez MD Responsible Provider: LY Gordillo CRNA    Anesthesia Type: MAC ASA Status: 3          Anesthesia Type: MAC    Reagan Phase I: Reagan Score: 10    Reagan Phase II: Reagan Score: 10    Last vitals: Reviewed and per EMR flowsheets.        Anesthesia Post Evaluation    Patient location during evaluation: PACU  Patient participation: complete - patient participated  Level of consciousness: awake and alert  Pain score: 2  Airway patency: patent  Nausea & Vomiting: no nausea and no vomiting  Complications: no  Cardiovascular status: blood pressure returned to baseline and hemodynamically stable  Respiratory status: acceptable, room air and spontaneous ventilation  Hydration status: euvolemic

## 2021-05-18 NOTE — PROGRESS NOTES
Pt arrives, awake, groggy but oriented, rates rt ear pain 4/10 CRNA medicates. VSS.  Family at bedside

## 2021-05-18 NOTE — PROGRESS NOTES

## 2021-05-19 ENCOUNTER — TELEPHONE (OUTPATIENT)
Dept: ENT CLINIC | Age: 46
End: 2021-05-19

## 2021-05-19 NOTE — TELEPHONE ENCOUNTER
Contacted patient to follow-up from surgery with Dr Josephine Hanson 05/18/21. Status post right T-tube placement. POD 1. Patient stated that she is doing well, mild ear discomfort is present. Confirmed post-op appointment scheduled 06/02/21.

## 2021-06-02 ENCOUNTER — OFFICE VISIT (OUTPATIENT)
Dept: ENT CLINIC | Age: 46
End: 2021-06-02
Payer: COMMERCIAL

## 2021-06-02 VITALS
DIASTOLIC BLOOD PRESSURE: 82 MMHG | WEIGHT: 229 LBS | SYSTOLIC BLOOD PRESSURE: 126 MMHG | HEART RATE: 74 BPM | HEIGHT: 71 IN | TEMPERATURE: 97.8 F | BODY MASS INDEX: 32.06 KG/M2 | RESPIRATION RATE: 18 BRPM

## 2021-06-02 DIAGNOSIS — K21.9 GASTROESOPHAGEAL REFLUX DISEASE, UNSPECIFIED WHETHER ESOPHAGITIS PRESENT: ICD-10-CM

## 2021-06-02 DIAGNOSIS — H69.81 EUSTACHIAN TUBE DYSFUNCTION, RIGHT: Primary | ICD-10-CM

## 2021-06-02 PROCEDURE — 99213 OFFICE O/P EST LOW 20 MIN: CPT | Performed by: OTOLARYNGOLOGY

## 2021-06-02 RX ORDER — FAMOTIDINE 20 MG/1
20 TABLET, FILM COATED ORAL EVERY EVENING
Qty: 30 TABLET | Refills: 3 | Status: SHIPPED | OUTPATIENT
Start: 2021-06-02

## 2021-06-02 ASSESSMENT — ENCOUNTER SYMPTOMS
BACK PAIN: 0
FACIAL SWELLING: 0
SORE THROAT: 0
RHINORRHEA: 0
SHORTNESS OF BREATH: 0
ABDOMINAL PAIN: 0
BLOOD IN STOOL: 0
PHOTOPHOBIA: 0
EYE ITCHING: 0
SINUS PRESSURE: 0
RECTAL PAIN: 0
COLOR CHANGE: 0
APNEA: 0
CONSTIPATION: 0
VOMITING: 0
COUGH: 0
SINUS PAIN: 0
ABDOMINAL DISTENTION: 0
CHEST TIGHTNESS: 0
EYE PAIN: 0
WHEEZING: 0
DIARRHEA: 0
EYE DISCHARGE: 0
EYE REDNESS: 0
VOICE CHANGE: 0
CHOKING: 0
STRIDOR: 0
NAUSEA: 0
ANAL BLEEDING: 0
TROUBLE SWALLOWING: 0

## 2021-06-02 NOTE — PROGRESS NOTES
[facility] [address] [place] [address] [facility]  Michele [address]  Dept: [phone]  MD Kimberly Castaneda 41 y.o. female     Patient presents with a chief complaint of Post-Op Check (s/p right myringotomy tube insertion 05/18/21. POD 15. Patient stated that she did not mention during last visit that she has experienced a few occasions of dysphagia followed by throat pain, questioning if related to reflux.)       /82 (Site: Right Upper Arm, Position: Sitting, Cuff Size: Medium Adult)   Pulse 74   Temp 97.8 °F (36.6 °C) (Infrared)   Resp 18   Ht 5' 11\" (1.803 m)   Wt 229 lb (103.9 kg)   BMI 31.94 kg/m²       History of Presenting Illness: The patient/caregiver reports a history of complaint with the following features: Onset: occasional ear fullness, often with burping  Timing: infrequent occurance  Duration: brief  Quality: fullness in right ear  Location: right ear  Severity: pain none  Risk factors: prior ear tube placement in right  Alleviating factors: relief with prior surgery  Aggravating factors: nothing makes it worse  Associated factors: has had episodes of throat pain and dysphagia, periodically    She has noted that diet triggers her throat complaint, but this has subsided over the last month. Review of systems covering 10 systems is reviewed as staff entry in other note and pertinent positives and negatives noted.     Past Medical History:   Diagnosis Date    Anxiety     Apnea     Enlarged lymph nodes     Headache     IBS (irritable bowel syndrome)     Meningismus     Neuralgia     Obesity     Small cell B-cell lymphoma (HCC)        Current Outpatient Medications:     famotidine (PEPCID) 20 MG tablet, Take 1 tablet by mouth every evening, Disp: 30 tablet, Rfl: 3    acetaminophen (TYLENOL) 325 MG tablet, Take 1 to 2 tabs (325 mg to 650 mg) by mouth every 4 to 6 hours as needed for post-op pain., Disp: 1 tablet, Rfl: 0    IBRUTINIB PO, Take by mouth, Disp: , Rfl:     valACYclovir (VALTREX) 500 MG tablet, Take 500 mg by mouth daily, Disp: , Rfl:     VENETOCLAX PO, Take by mouth, Disp: , Rfl:     Multiple Vitamin (THERA/BETA-CAROTENE) TABS, Take 1 tablet by mouth, Disp: , Rfl:     ibuprofen (ADVIL;MOTRIN) 200 MG tablet, Take 2 tablets by mouth every 6 hours as needed (post-op pain) (Patient not taking: Reported on 6/2/2021), Disp: 1 tablet, Rfl: 0    oxymetazoline (12 HOUR NASAL SPRAY) 0.05 % nasal spray, Put 3 drops into both ears 3 times a day for 3 days. This is to help keep ear tubes open. (Patient not taking: Reported on 6/2/2021), Disp: 1 Bottle, Rfl: 0   Allergies   Allergen Reactions    Dapsone Rash    Amoxicillin-Pot Clavulanate     Sulfa Antibiotics Rash      Past Surgical History:   Procedure Laterality Date    ANTERIOR CRUCIATE LIGAMENT REPAIR      BONE MARROW BIOPSY  09/2020    CHOLECYSTECTOMY      MYRINGOTOMY Right 5/18/2021    MYRINGOTOMY TUBE INSERTION performed by Modesto Hernandez MD at [place] [address] History     Socioeconomic History    Marital status:      Spouse name: Not on file    Number of children: Not on file    Years of education: Not on file    Highest education level: Not on file   Occupational History    Not on file   Tobacco Use    Smoking status: Never Smoker    Smokeless tobacco: Never Used   Vaping Use    Vaping Use: Never used   Substance and Sexual Activity    Alcohol use: Not Currently    Drug use: Never    Sexual activity: Not on file   Other Topics Concern    Not on file   Social History Narrative    Not on file     Social Determinants of Health     Financial Resource Strain:     Difficulty of Paying Living Expenses:    Food Insecurity:     Worried About [address] in the Last Year:     [facility] [facility] in the Last Year:    Transportation Needs:     Lack of Transportation (Medical):      Lack of Transportation (Non-Medical): Physical Activity:     Days of Exercise per Week:     Minutes of Exercise per Session:    Stress:     Feeling of Stress :    Social Connections:     Frequency of Communication with Friends and Family:     Frequency of Social Gatherings with Friends and Family:     Attends Shinto Services:     Active Member of Clubs or Organizations:     Attends Club or Organization Meetings:     Marital Status:    Intimate Partner Violence:     Fear of Current or Ex-Partner:     Emotionally Abused:     Physically Abused:     Sexually Abused:      History reviewed. No pertinent family history.      PHYSICAL EXAM:    The patient was examined today 6/2/2021 with findings as follows:    CONSTITUTIONAL:    General Appearance: well-appearing, nontoxic, alert, no acute distress     Communication: understanding at normal conversational tones, normal voicing, speech intelligible    HEAD/FACE:    Head: atraumatic, normocephalic, no lesions    Facial Inspection: no lesions, healthy skin    Facial Strength: motor strength normal, symmetric strength, symmetric movement, motor strength    Sinuses: no sinus tenderness    Salivary Glands: no enlargements of parotid glands, no tenderness of parotid glands, no masses of parotid glands, clear salivary flow on palpation from Stensen's ducts, no duct stones of Stensen's duct, no enlargement of submandibular glands, no tenderness of submandibular glands, no masses of submandibular glands, clear salivary flow from [place]'s ducts, no stones of [place]'s ducts    Temporomandibular Joint: no crepitus with motion, no tenderness on palpation, no trismus, motion symmetric    EYES:    Pupils: PERRLA, extra-ocular movements intact, no nystagmus, sclera white, no redness of eyes, no watering of eyes    EARS:    Bilateral External Ears: no pits, no tags    Right External Ear: normally formed, no lesions, no mastoid tenderness    Left External Ear: normally formed, no lesions, no mastoid tenderness ear complaints. The avoidance of water exposure to the ear and mechanical trauma such as the use of cotton tipped swabs or the insertion of objects into the ear is advised, as well as the need for ongoing follow-up while the tubes remain in place to ensure proper function and healing after extrusion. The patient and/or caregiver is to notify the office if no improvement or worsening of symptoms is noted prior to the scheduled follow-up for sooner evaluation. The patient and/or caregiver is able to state an understanding of these recommendations and is agreeable to the treatment plan. The patient and/or caregiver is advised on symptom management with the use of prescribed medication, weight loss, dietary changes with the avoidance of caffeine and carbonated beverages, the avoidance of heavy, spicy, or fatty meals and breaking meals up into several small meals throughout the day, the avoidance of eating less than two hours prior to bedtime, the elevation of the head of bed, and the practice of laryngeal hygiene with frequent sips of water to clear the throat and to maintain adequate hydration. The patient and/or caregiver is to notify the office if no improvement or worsening of symptoms is noted prior to the scheduled follow-up for sooner evaluation. We have discussed that the safety of long term use of certain agents is uncertain and that should long term therapy be needed that loss of bone density, decreased absorption of some vitamins and minerals, and injury to internal organs has been reported. Weaning off of these medications when able may have long term benefits in reducing these risks. The patient and/or caregiver is able to state an understanding of these recommendations and is agreeable to the treatment plan. Diagnosis Orders   1. Eustachian tube dysfunction, right     2.  Gastroesophageal reflux disease, unspecified whether esophagitis present  famotidine (PEPCID) 20 MG tablet      Return in

## 2021-10-20 ENCOUNTER — OFFICE VISIT (OUTPATIENT)
Dept: ENT CLINIC | Age: 46
End: 2021-10-20
Payer: COMMERCIAL

## 2021-10-20 VITALS
HEART RATE: 81 BPM | WEIGHT: 237 LBS | HEIGHT: 71 IN | RESPIRATION RATE: 18 BRPM | OXYGEN SATURATION: 98 % | SYSTOLIC BLOOD PRESSURE: 124 MMHG | TEMPERATURE: 97.3 F | BODY MASS INDEX: 33.18 KG/M2 | DIASTOLIC BLOOD PRESSURE: 86 MMHG

## 2021-10-20 DIAGNOSIS — H69.81 EUSTACHIAN TUBE DYSFUNCTION, RIGHT: Primary | ICD-10-CM

## 2021-10-20 DIAGNOSIS — H72.01 CENTRAL PERFORATION OF TYMPANIC MEMBRANE OF RIGHT EAR: ICD-10-CM

## 2021-10-20 DIAGNOSIS — H60.321 ACUTE HEMORRHAGIC OTITIS EXTERNA OF RIGHT EAR: ICD-10-CM

## 2021-10-20 PROCEDURE — 99213 OFFICE O/P EST LOW 20 MIN: CPT | Performed by: OTOLARYNGOLOGY

## 2021-10-20 ASSESSMENT — ENCOUNTER SYMPTOMS
SORE THROAT: 0
CHEST TIGHTNESS: 0
WHEEZING: 0
ABDOMINAL DISTENTION: 0
VOICE CHANGE: 0
RECTAL PAIN: 0
CHOKING: 0
BLOOD IN STOOL: 0
TROUBLE SWALLOWING: 0
PHOTOPHOBIA: 0
ABDOMINAL PAIN: 0
DIARRHEA: 0
BACK PAIN: 0
VOMITING: 0
COLOR CHANGE: 0
STRIDOR: 0
RHINORRHEA: 0
SINUS PRESSURE: 0
EYE REDNESS: 0
CONSTIPATION: 0
FACIAL SWELLING: 0
APNEA: 0
ANAL BLEEDING: 0
EYE PAIN: 0
COUGH: 0
SHORTNESS OF BREATH: 0
EYE DISCHARGE: 0
SINUS PAIN: 0
EYE ITCHING: 0
NAUSEA: 0

## 2021-10-20 NOTE — PROGRESS NOTES
Review of Systems   Constitutional: Negative for activity change, appetite change, chills, diaphoresis, fatigue, fever and unexpected weight change. HENT: Positive for ear discharge and ear pain. Negative for congestion, dental problem, drooling, facial swelling, hearing loss, mouth sores, nosebleeds, postnasal drip, rhinorrhea, sinus pressure, sinus pain, sneezing, sore throat, tinnitus, trouble swallowing and voice change. Eyes: Negative for photophobia, pain, discharge, redness, itching and visual disturbance. Respiratory: Negative for apnea, cough, choking, chest tightness, shortness of breath, wheezing and stridor. Cardiovascular: Negative for chest pain, palpitations and leg swelling. Gastrointestinal: Negative for abdominal distention, abdominal pain, anal bleeding, blood in stool, constipation, diarrhea, nausea, rectal pain and vomiting. Endocrine: Negative for cold intolerance, heat intolerance, polydipsia, polyphagia and polyuria. Genitourinary: Negative for decreased urine volume, difficulty urinating, dyspareunia, dysuria, enuresis, flank pain, frequency, genital sores, hematuria, menstrual problem, pelvic pain, urgency, vaginal bleeding, vaginal discharge and vaginal pain. Musculoskeletal: Negative for arthralgias, back pain, gait problem, joint swelling, myalgias, neck pain and neck stiffness. Skin: Negative for color change, pallor, rash and wound. Allergic/Immunologic: Negative for environmental allergies, food allergies and immunocompromised state. Neurological: Negative for dizziness, tremors, seizures, syncope, facial asymmetry, speech difficulty, weakness, light-headedness, numbness and headaches. Hematological: Negative for adenopathy. Does not bruise/bleed easily. Psychiatric/Behavioral: Negative for agitation, behavioral problems, confusion, decreased concentration, dysphoric mood, hallucinations, self-injury, sleep disturbance and suicidal ideas.  The patient is not nervous/anxious and is not hyperactive.

## 2021-10-20 NOTE — PROGRESS NOTES
[facility] [address] [place] [address] [facility]  [address] [facility] [address]  Dept: [phone]  Carmenza Becker MD    Jma Pinedaer 40 y.o. female     Patient presents with a chief complaint of Ear Drainage (c/o bleeding in right ear on and off. )       /86   Pulse 81   Temp 97.3 °F (36.3 °C) (Temporal)   Resp 18   Ht 5' 11\" (1.803 m)   Wt 237 lb (107.5 kg)   SpO2 98%   BMI 33.05 kg/m²       History of Presenting Illness: The patient/caregiver reports a history of complaint with the following features: Onset: started in early August  Timing: sudden onset, since has had irritation and episodic ear pain  Duration: brief bleeds, infrequent  Quality: bloody discharge from right ear initially, now with dried blood  Location: right ear  Severity: pain none  Risk factors: prior ear tube on right  Alleviating factors: nothing gives relief  Aggravating factors: nothing makes it worse  Associated factors: no hearing loss, no vertigo    Review of systems covering 10 systems is reviewed as staff entry in other note and pertinent positives and negatives noted.     Past Medical History:   Diagnosis Date    Anxiety     Apnea     Enlarged lymph nodes     Headache     IBS (irritable bowel syndrome)     Meningismus     Neuralgia     Obesity     Small cell B-cell lymphoma (HCC)        Current Outpatient Medications:     famotidine (PEPCID) 20 MG tablet, Take 1 tablet by mouth every evening, Disp: 30 tablet, Rfl: 3    IBRUTINIB PO, Take by mouth, Disp: , Rfl:     valACYclovir (VALTREX) 500 MG tablet, Take 500 mg by mouth daily, Disp: , Rfl:     VENETOCLAX PO, Take by mouth, Disp: , Rfl:     Multiple Vitamin (THERA/BETA-CAROTENE) TABS, Take 1 tablet by mouth, Disp: , Rfl:     ibuprofen (ADVIL;MOTRIN) 200 MG tablet, Take 2 tablets by mouth every 6 hours as needed (post-op pain) (Patient not taking: Reported on 6/2/2021), Disp: 1 tablet, Rfl: 0   acetaminophen (TYLENOL) 325 MG tablet, Take 1 to 2 tabs (325 mg to 650 mg) by mouth every 4 to 6 hours as needed for post-op pain. (Patient not taking: Reported on 10/20/2021), Disp: 1 tablet, Rfl: 0    oxymetazoline (12 HOUR NASAL SPRAY) 0.05 % nasal spray, Put 3 drops into both ears 3 times a day for 3 days. This is to help keep ear tubes open. (Patient not taking: Reported on 6/2/2021), Disp: 1 Bottle, Rfl: 0   Allergies   Allergen Reactions    Dapsone Rash    Amoxicillin-Pot Clavulanate     Sulfa Antibiotics Rash      Past Surgical History:   Procedure Laterality Date    ANTERIOR CRUCIATE LIGAMENT REPAIR      BONE MARROW BIOPSY  09/2020    CHOLECYSTECTOMY      MYRINGOTOMY Right 5/18/2021    MYRINGOTOMY TUBE INSERTION performed by Isi Ortiz MD at [place] [address] History     Socioeconomic History    Marital status:      Spouse name: Not on file    Number of children: Not on file    Years of education: Not on file    Highest education level: Not on file   Occupational History    Not on file   Tobacco Use    Smoking status: Never Smoker    Smokeless tobacco: Never Used   Vaping Use    Vaping Use: Never used   Substance and Sexual Activity    Alcohol use: Not Currently    Drug use: Never    Sexual activity: Not on file   Other Topics Concern    Not on file   Social History Narrative    Not on file     Social Determinants of Health     Financial Resource Strain:     Difficulty of Paying Living Expenses:    Food Insecurity:     Worried About [address] in the Last Year:     [address] in the Last Year:    Transportation Needs:     Lack of Transportation (Medical):      Lack of Transportation (Non-Medical):    Physical Activity:     Days of Exercise per Week:     Minutes of Exercise per Session:    Stress:     Feeling of Stress :    Social Connections:     Frequency of Communication with Friends and Family:     Frequency of Social Gatherings with Friends and Family:     Attends Anabaptist Services:     Active Member of Clubs or Organizations:     Attends Club or Organization Meetings:     Marital Status:    Intimate Partner Violence:     Fear of Current or Ex-Partner:     Emotionally Abused:     Physically Abused:     Sexually Abused:      No family history on file.      PHYSICAL EXAM:    The patient was examined today 10/20/2021 with findings as follows:    CONSTITUTIONAL:    General Appearance: well-appearing, nontoxic, alert, no acute distress     Communication: understanding at normal conversational tones, normal voicing, speech intelligible    HEAD/FACE:    Head: atraumatic, normocephalic, no lesions    Facial Inspection: no lesions, healthy skin    Facial Strength: motor strength normal, symmetric strength, symmetric movement, motor strength    Sinuses: no sinus tenderness    Salivary Glands: no enlargements of parotid glands, no tenderness of parotid glands, no masses of parotid glands, clear salivary flow on palpation from Stensen's ducts, no duct stones of Stensen's duct, no enlargement of submandibular glands, no tenderness of submandibular glands, no masses of submandibular glands, clear salivary flow from [place]'s ducts, no stones of [place]'s ducts    Temporomandibular Joint: no crepitus with motion, no tenderness on palpation, no trismus, motion symmetric    EYES:    Pupils: PERRLA, extra-ocular movements intact, no nystagmus, sclera white, no redness of eyes, no watering of eyes    EARS:    Bilateral External Ears: no pits, no tags    Right External Ear: normally formed, no lesions, no mastoid tenderness    Left External Ear: normally formed, no lesions, no mastoid tenderness    Right External Auditory Canal: dried blood clot encasing extruded T-tube removed    Left External Auditory Canal: normal, healthy skin, no obstructing cerumen, no discharge    Right Tympanic Membrane: normal landmarks, translucent, small zane-inferior perforation at prior tube site    Left Tympanic Membrane: normal landmarks, translucent, mobile to pneumatic otoscopy, no perforation    Hearing: intact to spoken voice, intact to finger rub    SKIN:    General Appearance: no lesions, warm and dry, normal turgor, no bruising    NEUROLOGICAL SYSTEM:    Orientation: oriented to time, oriented to place, oriented to person    Cranial Nerves: Cranial Nerves II-XII intact, normal facial movement    PSYCHIATRIC:    Mood and affect: normal mood, normal affect          Assessment and Plan:    She has had spontaneous extrusion of her right T-tube with bleeding that has resulted in a large clot. This is removed today with some brief discomfort topicalized with lidocaine for relief. Unfortunately this has resulted in the loss of her ear tube. There is a small perforation with will allow for ventilation of the middle ear space for now, but I anticipate this will close over time. She does not that the ear opened after cleaning and that her hearing is improved. Diagnosis Orders   1. Eustachian tube dysfunction, right     2. Acute hemorrhagic otitis externa of right ear     3. Central perforation of tympanic membrane of right ear        Return in about 6 months (around 4/20/2022). The patient and/or caregiver is to notify the office if no improvement or worsening of symptoms is noted prior to the scheduled follow-up for sooner evaluation. The patient and/or caregiver is able to state an understanding of these recommendations and is agreeable to the treatment plan. --Sudarshan Sanchez MD on 10/20/2021 at 9:49 AM    An electronic signature was used to authenticate this note.

## 2022-04-06 ENCOUNTER — OFFICE VISIT (OUTPATIENT)
Dept: ENT CLINIC | Age: 47
End: 2022-04-06
Payer: COMMERCIAL

## 2022-04-06 ENCOUNTER — TELEPHONE (OUTPATIENT)
Dept: ENT CLINIC | Age: 47
End: 2022-04-06

## 2022-04-06 VITALS
HEART RATE: 76 BPM | TEMPERATURE: 97.3 F | RESPIRATION RATE: 18 BRPM | SYSTOLIC BLOOD PRESSURE: 128 MMHG | OXYGEN SATURATION: 97 % | WEIGHT: 245 LBS | BODY MASS INDEX: 34.17 KG/M2 | DIASTOLIC BLOOD PRESSURE: 84 MMHG

## 2022-04-06 DIAGNOSIS — H69.81 EUSTACHIAN TUBE DYSFUNCTION, RIGHT: ICD-10-CM

## 2022-04-06 DIAGNOSIS — J32.2 CHRONIC ETHMOIDAL SINUSITIS: Primary | ICD-10-CM

## 2022-04-06 PROCEDURE — 99213 OFFICE O/P EST LOW 20 MIN: CPT | Performed by: OTOLARYNGOLOGY

## 2022-04-06 RX ORDER — DOXYCYCLINE HYCLATE 100 MG
100 TABLET ORAL 2 TIMES DAILY
Qty: 28 TABLET | Refills: 0 | Status: SHIPPED | OUTPATIENT
Start: 2022-04-06 | End: 2022-04-20

## 2022-04-06 RX ORDER — DOXYCYCLINE HYCLATE 100 MG
100 TABLET ORAL 2 TIMES DAILY
Qty: 14 TABLET | Refills: 0 | Status: SHIPPED | OUTPATIENT
Start: 2022-04-06 | End: 2022-04-06

## 2022-04-06 ASSESSMENT — ENCOUNTER SYMPTOMS
GASTROINTESTINAL NEGATIVE: 1
EYES NEGATIVE: 1
ALLERGIC/IMMUNOLOGIC NEGATIVE: 1
RESPIRATORY NEGATIVE: 1

## 2022-04-06 NOTE — PATIENT INSTRUCTIONS
SURVEY:    You may be receiving a survey from [employer] regarding your visit today. Please complete the survey to enable us to provide the highest quality of care to you and your family. If you cannot score us a very good on any question, please call the office to discuss how we could have made your experience a very good one. Thank you. MD Deborah Martinez MD Darra Shoulders, MD Heriberto Hoyle, MD Jacklynn Fujisawa, MD Tiney Collet, Brooks Chavez [place], [place]  Stephanie Mcrae LPN  [facility] [place] [facility], [place], [place]  Belkis Coles, PCA    NASAL SALINE (SALTWATER) RINSES     Your doctor has recommended the use of saline (salt water) nasal rinses. Nasal rinses have been used for centuries for the treatment of nasal and sinus infection, bothersome secretions, allergy, and nasal dryness. It is safe and effective for use in both children and adults. Daily rinsing of the nasal passages with saline promotes nasal and sinus health by washing away dried mucus, infected secretions, dust, pollen, and mold spores, by moisturizing the nasal lining, promoting normal mucus flow, and naturally decongesting inflamed tissues. Rinses also help with clearing dried blood, mucus, and infection after sinus surgery and promote rapid healing of tissue in this setting. In the beginning it is normal to have some difficulty with performing nasal rinses, but with practice, most people find that it becomes part of their normal routine just as much as brushing teeth or showering, and often, most wouldnt think of leaving home without it. RINSE INGREDIENTS:  No special tools are required for nasal saline rinses, and everything that you need can be picked up at your local pharmacy and grocery.   Commercial saline rinses and syringe systems such as NeilMed SINUS RINSE, Ayr Saline Nasal Rinse, or Neti Pot are available, but these may contain preservatives or other irritants, and it is often less expensive and more convenient to make it at home. You will need the following:  Distilled water (tap water contains irritating minerals and bacteria, but may be used if boiled)  Kosher, pickling, or non-iodized table salt (regular table salt contains iodine, an irritant)  Baking soda (not baking powder)  Corn syrup (optional, for additional moisturizing effect)  White vinegar (for antiseptic effect, if recommended by your doctor)  An airtight container for mixing saline rinse  Rubber bulb syringe (like those used to clear infant noses and ears)  Household bleach (for cleaning container and bulb syringe)    PREPARATION:  To prepare the rinses, measure out 1 quart (1 liter) of distilled or boiled tap water into the mixing container. Then add 2-3 heaping teaspoons of salt and 1 teaspoon of baking soda, mix to dissolve, and place in refrigerator for 1-2 days of storage. You may add 3-4 tablespoons of corn syrup if you experience nasal dryness. If your doctor recommends it, add 1 teaspoon of white vinegar to this mixture as well. STORAGE & CLEANING:  It is advised that you make up fresh rinse every day or two, and that you keep the unused rinse in the refrigerator in an airtight container to prevent bacterial growth. Set out enough rinse for your next use well in advance to let it come to room temperature before use. Wash the container and bulb syringe at least once a week in a quart of water with 2 tablespoons of bleach, rinse and dry to prevent bacterial growth    USING SALINE RINSES:  To perform nasal saline rinses, plan on using at least 1 pint (2 cups) twice daily. Lean over a sink or other basin (some people prefer to do this in the shower as it can be messy, especially when you first start) to catch the rinse as it drains from your nose and mouth. Fill the bulb syringe with the rinse solution and place it into the nostril on one side.   Gently squeeze the bulb to flush the nasal passage until the rinse drains clear. Repeat on the other side. You should plan on using the rinses twice a day, which should take about 10 minutes to perform. OTHER MEDICATIONS:  If your doctor has prescribed other nasal spray medications, such as a nasal steroid, it is best to apply these after the nasal rinse so that you do not wash the medication away. It is also safe to continue with your other antihistamine or decongestant medications that your doctor may have prescribed in addition to the saline rinses. If you have an allergy or adverse reaction to any of the ingredients do not use it in the preparation of the saline rinses. WHEN TO CALL US:  Stop the rinses and notify your doctor if you experience severe pain in the nose or ears, bleeding, or any other problems with the use of the nasal saline rinses. It is normal, especially in the beginning, to experience drainage of saline and nasal secretions into the throat. This is best avoided by holding your breath and leaning forward when using the rinses. Experienced users often will have the rinse exit the opposite nostril without drainage into the throat at all, and this can be achieved by most people with practice. Please call us if you have any additional questions or concerns. NOTICE:  THIS DOCUMENT IS INTENDED SOLELY FOR PATIENT EDUCATIONAL PURPOSES AND IS PROVIDED AS A COURTESY TO PATIENTS OF DR. Saar Paris MD AND Briana Nayak PA-C. IT IS NOT INTENDED AS A SUBSTITUTE FOR PROFESSIONAL MEDICAL CARE OR ADVICE. THE PATIENT SHOULD SEEK ADVICE FROM THE PHYSICIAN IF THERE ARE ANY QUESTIONS ABOUT THE CONTENTS OR DIRECTIONS PROVIDED IN THIS DOCUMENT.

## 2022-04-06 NOTE — TELEPHONE ENCOUNTER
Spoke with [facility] 27 clarifying ATB order. Two orders placed one for 7 days one for 14 days. Per patients chart to take antibiotic for 14 days.

## 2022-04-06 NOTE — PROGRESS NOTES
[facility]  [address] [facility] [address]  Dept: [phone]  MD Flora Redding 40 y.o. female     Patient presents with a chief complaint of Ear Problem (ETD; patient states that her ears are doing fine at this time. ), Gastroesophageal Reflux (patient is off the CA medications and states that her gerd symptoms are stable at this time ), and Sinus Problem (c/o sinus problems since COVID in Jan. She cannot clear the mucous out. Thick, creamy mucous. )       /84   Pulse 76   Temp 97.3 °F (36.3 °C) (Temporal)   Resp 18   Wt 245 lb (111.1 kg)   SpO2 97%   BMI 34.17 kg/m²       History of Presenting Illness: The patient/caregiver reports a history of complaint with the following features: Onset: started a while ago  Timing: ongoing  Duration: months  Quality: foreign body sensation in right ear  Location: right ear  Severity: pain none  Risk factors: ear tubes in both ears now out  Alleviating factors: nothing gives relief  Aggravating factors: nothing makes it worse  Associated factors: severe nasal congestion last 3 months, improved slightly then COVID infection which worsened symptom with loss of taste and smell, purulent nasal discharge    Review of systems covering 10 systems is reviewed as staff entry in other note and pertinent positives and negatives noted.     Past Medical History:   Diagnosis Date    Anxiety     Apnea     Enlarged lymph nodes     Headache     IBS (irritable bowel syndrome)     Meningismus     Neuralgia     Obesity     Small cell B-cell lymphoma (HCC)        Current Outpatient Medications:     doxycycline hyclate (VIBRA-TABS) 100 MG tablet, Take 1 tablet by mouth 2 times daily for 14 days, Disp: 28 tablet, Rfl: 0    famotidine (PEPCID) 20 MG tablet, Take 1 tablet by mouth every evening, Disp: 30 tablet, Rfl: 3    valACYclovir (VALTREX) 500 MG tablet, Take 500 mg by mouth daily, Disp: , Rfl:     VENETOCLAX PO, Take by mouth, Disp: , Rfl:     Multiple Vitamin (THERA/BETA-CAROTENE) TABS, Take 1 tablet by mouth, Disp: , Rfl:     ibuprofen (ADVIL;MOTRIN) 200 MG tablet, Take 2 tablets by mouth every 6 hours as needed (post-op pain) (Patient not taking: Reported on 6/2/2021), Disp: 1 tablet, Rfl: 0    acetaminophen (TYLENOL) 325 MG tablet, Take 1 to 2 tabs (325 mg to 650 mg) by mouth every 4 to 6 hours as needed for post-op pain. (Patient not taking: Reported on 10/20/2021), Disp: 1 tablet, Rfl: 0    oxymetazoline (12 HOUR NASAL SPRAY) 0.05 % nasal spray, Put 3 drops into both ears 3 times a day for 3 days. This is to help keep ear tubes open.  (Patient not taking: Reported on 6/2/2021), Disp: 1 Bottle, Rfl: 0    IBRUTINIB PO, Take by mouth (Patient not taking: Reported on 4/6/2022), Disp: , Rfl:    Allergies   Allergen Reactions    Dapsone Rash    Amoxicillin-Pot Clavulanate     Sulfa Antibiotics Rash      Past Surgical History:   Procedure Laterality Date    ANTERIOR CRUCIATE LIGAMENT REPAIR      BONE MARROW BIOPSY  09/2020    CHOLECYSTECTOMY      MYRINGOTOMY Right 5/18/2021    MYRINGOTOMY TUBE INSERTION performed by [facility]i [facility] OR      Social History     Socioeconomic History    Marital status:      Spouse name: Not on file    Number of children: Not on file    Years of education: Not on file    Highest education level: Not on file   Occupational History    Not on file   Tobacco Use    Smoking status: Never Smoker    Smokeless tobacco: Never Used   Vaping Use    Vaping Use: Never used   Substance and Sexual Activity    Alcohol use: Not Currently    Drug use: Never    Sexual activity: Not on file   Other Topics Concern    Not on file   Social History Narrative    Not on file     Social Determinants of Health     Financial Resource Strain:     Difficulty of Paying Living Expenses: Not on file Food Insecurity:     Worried About Running Out of Food in the Last Year: Not on file    Raysa of Food in the Last Year: Not on file   Transportation Needs:     Lack of Transportation (Medical): Not on file    Lack of Transportation (Non-Medical): Not on file   Physical Activity:     Days of Exercise per Week: Not on file    Minutes of Exercise per Session: Not on file   Stress:     Feeling of Stress : Not on file   Social Connections:     Frequency of Communication with Friends and Family: Not on file    Frequency of Social Gatherings with Friends and Family: Not on file    Attends Hinduism Services: Not on file    Active Member of [address] [place] [address] [employer] or Organizations: Not on file    Attends Club or Organization Meetings: Not on file    Marital Status: Not on file   Intimate Partner Violence:     Fear of Current or Ex-Partner: Not on file    Emotionally Abused: Not on file    Physically Abused: Not on file    Sexually Abused: Not on file   Housing Stability:     Unable to Pay for Housing in the Last Year: Not on file    Number of [place] in the Last Year: Not on file    Unstable Housing in the Last Year: Not on file     No family history on file.      PHYSICAL EXAM:    The patient was examined today 4/6/2022 with findings as follows:    CONSTITUTIONAL:    General Appearance: well-appearing, nontoxic, alert, no acute distress     Communication: understanding at normal conversational tones, normal voicing, speech intelligible    HEAD/FACE:    Head: atraumatic, normocephalic, no lesions    Facial Inspection: no lesions, healthy skin    Facial Strength: motor strength normal, symmetric strength, symmetric movement, motor strength    Sinuses: no sinus tenderness    Salivary Glands: no enlargements of parotid glands, no tenderness of parotid glands, no masses of parotid glands, clear salivary flow on palpation from Stensen's ducts, no duct stones of Stensen's duct, no enlargement of submandibular glands, no tenderness of submandibular glands, no masses of submandibular glands, clear salivary flow from Thaxton's ducts, no stones of Tia's ducts    Temporomandibular Joint: no crepitus with motion, no tenderness on palpation, no trismus, motion symmetric    EYES:    Pupils: PERRLA, extra-ocular movements intact, no nystagmus, sclera white, no redness of eyes, no watering of eyes    EARS:    Bilateral External Ears: no pits, no tags    Right External Ear: normally formed, no lesions, no mastoid tenderness    Left External Ear: normally formed, no lesions, no mastoid tenderness    Right External Auditory Canal: normal, healthy skin, obstructing cerumen removed, no discharge    Left External Auditory Canal: normal, healthy skin, no obstructing cerumen, no discharge    Right Tympanic Membrane: normal landmarks, translucent, mobile to pneumatic otoscopy, no perforation    Left Tympanic Membrane: normal landmarks, translucent, mobile to pneumatic otoscopy, no perforation    Hearing: intact to spoken voice, intact to finger rub, Montesinos midline, Right Ear: Rinne AC>BC, Left Left Ear: Rinne AC>BC    NOSE:    Nasal Skin: no lesions, no lacerations, no scars    Nasal Dorsum: symmetric with no visible or palpable deformities    Nasal Tip: normal symmetric nasal tip, normal nasal valves    Nasal Mucosa: purulent discharge bilateral    Septum: not markedly deformed, midline, no exposed vessels, no bleeding, no septal granuloma    Turbinates: normal size and conformation    Nasopharynx: normal    ORAL CAVITY/MOUTH:    Lips, teeth, gums: normal lips, normal gums, dentition intact, no dental pain on palpation    Oral Mucosa: normal, moist, no lesions    Palate: normal hard palate, normal soft palate, symmetric palatal elevation    Floor of Mouth: normal floor of mouth    Tongue: normal tongue, no lesions, no edema, no masses, normal mucosa, mobile    Tonsils: normal tonsils, symmetric, no lesions    Posterior pharynx: normal    NECK:    Neck: no masses, trachea midline, normal range of motion, no cysts or pits, no tenderness to palpation    Thyroid: normal thyroid, no enlargement, no tenderness, no nodules    LYMPH NODES:    Cervical: no palpable lymph node enlargement    RESPIRATORY:    Inspection/Auscultation: good air movement, chest expands symmetrically, normal breath sounds, no wheezing, no stridor    CARDIOVASCULAR SYSTEM:    Auscultation: regular rate and rhythm, carotid pulse normal, no carotid thrills, no carotid bruits    Observation/Palpation of Peripheral Vascular System: no varicosities, no cyanosis, no edema    SKIN:    General Appearance: no lesions, warm and dry, normal turgor, no bruising    NEUROLOGICAL SYSTEM:    Orientation: oriented to time, oriented to place, oriented to person    Cranial Nerves: Cranial Nerves II-XII intact, normal facial movement    PSYCHIATRIC:    Mood and affect: normal mood, normal affect        Assessment and Plan:    The patient and/or caregiver is advised on the use of any prescribed medication and the rationale for radiographic imaging. The patient and/or caregiver is advised on the use of saline nasal rinses for moisturization, decongestion, and cleansing of the nasal and sinus cavities and an informational sheet on the preparation and use of saline is provided. The patient and/or caregiver is to notify the office if no improvement or worsening of symptoms is noted prior to the scheduled follow-up for sooner evaluation. The risk of bowel infection on prolonged antibiotic therapy is discussed and the patient is asked to notify me for persistent diarrhea. The patient and/or caregiver is able to state an understanding of these recommendations and is agreeable to the treatment plan. Diagnosis Orders   1. Chronic ethmoidal sinusitis  doxycycline hyclate (VIBRA-TABS) 100 MG tablet    DISCONTINUED: doxycycline hyclate (VIBRA-TABS) 100 MG tablet   2.  Eustachian tube dysfunction, right        Return in about 3 weeks (around 4/27/2022). The patient and/or caregiver is to notify the office if no improvement or worsening of symptoms is noted prior to the scheduled follow-up for sooner evaluation. The patient and/or caregiver is able to state an understanding of these recommendations and is agreeable to the treatment plan. --Izabella Juarez MD on 4/6/2022 at 9:44 AM    An electronic signature was used to authenticate this note. Diagnosis Orders   1. Chronic ethmoidal sinusitis  doxycycline hyclate (VIBRA-TABS) 100 MG tablet    DISCONTINUED: doxycycline hyclate (VIBRA-TABS) 100 MG tablet   2. Eustachian tube dysfunction, right        Return in about 3 weeks (around 4/27/2022). The patient and/or caregiver is to notify the office if no improvement or worsening of symptoms is noted prior to the scheduled follow-up for sooner evaluation. The patient and/or caregiver is able to state an understanding of these recommendations and is agreeable to the treatment plan. --Izabella Juarez MD on 4/6/2022 at 9:44 AM    An electronic signature was used to authenticate this note.

## (undated) DEVICE — MEDI-VAC NON-CONDUCTIVE SUCTION TUBING 6MM X 6.1M (20 FT.) L: Brand: CARDINAL HEALTH

## (undated) DEVICE — GLOVE ORANGE PI 7 1/2   MSG9075

## (undated) DEVICE — BLADE MYR 45DEG OFFSET S STL LANC TIP NAR SHFT DISP BEAV

## (undated) DEVICE — SOLUTION IV IRRIG 500ML 0.9% SODIUM CHL 2F7123

## (undated) DEVICE — 1200CC SUCTION CANISTER WITH HYDROPHOBIC FILTER AND RED LID: Brand: BEMIS